# Patient Record
Sex: FEMALE | Race: WHITE | NOT HISPANIC OR LATINO | Employment: OTHER | URBAN - METROPOLITAN AREA
[De-identification: names, ages, dates, MRNs, and addresses within clinical notes are randomized per-mention and may not be internally consistent; named-entity substitution may affect disease eponyms.]

---

## 2019-07-12 ENCOUNTER — APPOINTMENT (OUTPATIENT)
Dept: RADIOLOGY | Facility: MEDICAL CENTER | Age: 74
End: 2019-07-12
Payer: MEDICARE

## 2019-07-12 ENCOUNTER — OFFICE VISIT (OUTPATIENT)
Dept: URGENT CARE | Facility: MEDICAL CENTER | Age: 74
End: 2019-07-12
Payer: MEDICARE

## 2019-07-12 VITALS
SYSTOLIC BLOOD PRESSURE: 154 MMHG | OXYGEN SATURATION: 96 % | DIASTOLIC BLOOD PRESSURE: 90 MMHG | TEMPERATURE: 98.4 F | RESPIRATION RATE: 18 BRPM | BODY MASS INDEX: 30.12 KG/M2 | WEIGHT: 170 LBS | HEART RATE: 97 BPM | HEIGHT: 63 IN
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
## 2019-07-12 DIAGNOSIS — S63.502A SPRAIN OF LEFT WRIST, INITIAL ENCOUNTER: Primary | ICD-10-CM

## 2019-07-12 PROCEDURE — 99204 OFFICE O/P NEW MOD 45 MIN: CPT | Performed by: FAMILY MEDICINE

## 2019-07-12 PROCEDURE — 73110 X-RAY EXAM OF WRIST: CPT

## 2019-07-12 PROCEDURE — G0463 HOSPITAL OUTPT CLINIC VISIT: HCPCS | Performed by: FAMILY MEDICINE

## 2019-07-12 RX ORDER — FLUTICASONE PROPIONATE AND SALMETEROL XINAFOATE 230; 21 UG/1; UG/1
2 AEROSOL, METERED RESPIRATORY (INHALATION) 2 TIMES DAILY
COMMUNITY
Start: 2019-07-02

## 2019-07-12 RX ORDER — PHENYTOIN SODIUM 100 MG/1
100 CAPSULE, EXTENDED RELEASE ORAL EVERY 12 HOURS SCHEDULED
COMMUNITY
Start: 2019-05-25 | End: 2020-01-04 | Stop reason: HOSPADM

## 2019-07-12 RX ORDER — ERGOCALCIFEROL 1.25 MG/1
CAPSULE ORAL
Refills: 0 | COMMUNITY
Start: 2019-06-18

## 2019-07-12 RX ORDER — CETIRIZINE HYDROCHLORIDE 10 MG/1
TABLET ORAL
COMMUNITY
Start: 2019-07-02

## 2019-07-12 RX ORDER — THEOPHYLLINE 400 MG/1
400 TABLET, EXTENDED RELEASE ORAL 2 TIMES DAILY
COMMUNITY
Start: 2019-07-08

## 2019-07-12 RX ORDER — ALLOPURINOL 100 MG/1
TABLET ORAL
COMMUNITY
Start: 2019-04-30

## 2019-07-12 RX ORDER — FLUTICASONE PROPIONATE 50 MCG
SPRAY, SUSPENSION (ML) NASAL
COMMUNITY
Start: 2019-07-02

## 2019-07-12 NOTE — PROGRESS NOTES
[facility]        NAME: Philippe Corcoran is a 74 y o  female  : 1945    MRN: [id number]  DATE: 2019  TIME: 7:35 PM    Assessment and Plan   Sprain of left wrist, initial encounter [S63 502A]  1  Sprain of left wrist, initial encounter     2  Fall, initial encounter  XR wrist 3+ vw left       X-ray shows diffuse osteoarthritis of the carpals and disturbed radius ulnar joint  Will place the patient in a brace  If pain continues follow-up with Orthopedics  Patient Instructions       Follow up with PCP in 3-5 days  Proceed to  ER if symptoms worsen  Chief Complaint     Chief Complaint   Patient presents with    Wrist Pain     Patient c/o L wrist pain after she fell today          History of Present Illness        40-year-old female presents with left wrist pain  She states that 2 days ago she tripped over her dog and fell on her hand  Ever since she has had left-sided wrist pain  Pain is worse with movement of the wrist and better with rest   She does have osteoarthritis of her hands bilaterally  She does have localized swelling        Review of Systems   Review of Systems  As above     Current Medications       Current Outpatient Medications:     ADVAIR -21 MCG/ACT inhaler, , Disp: , Rfl:     allopurinol (ZYLOPRIM) 100 mg tablet, , Disp: , Rfl:     cetirizine (ZyrTEC) 10 mg tablet, , Disp: , Rfl:     ergocalciferol (VITAMIN D2) 50,000 units, TK 1 C PO 1 TIME Q WK, Disp: , Rfl: 0    fluticasone (FLONASE) 50 mcg/act nasal spray, , Disp: , Rfl:     phenytoin (DILANTIN) 100 mg ER capsule, , Disp: , Rfl:     theophylline (UNIPHYL) 400 mg 24 hr tablet, , Disp: , Rfl:     VENTOLIN  (90 Base) MCG/ACT inhaler, , Disp: , Rfl:     Current Allergies     Allergies as of 2019    (No Known Allergies)            The following portions of the patient's history were reviewed and updated as appropriate: allergies, current medications, past family history, past medical history, past social history, past surgical history and problem list      No past medical history on file  No past surgical history on file  No family history on file  Medications have been verified  Objective   /90   Pulse 97   Temp 98 4 °F (36 9 °C)   Resp 18   Ht 5' 2 5" (1 588 m)   Wt 77 1 kg (170 lb)   SpO2 96%   BMI 30 60 kg/m²        Physical Exam     Physical Exam   Constitutional: She is oriented to person, place, and time  She appears well-developed and well-nourished  HENT:   Head: Normocephalic and atraumatic  Eyes: Conjunctivae are normal    Neck: Neck supple  Cardiovascular: Normal rate  Pulmonary/Chest: Effort normal  No respiratory distress  Abdominal: Soft  Musculoskeletal:     Robert's and Heberden's noted bilaterally   distal pulses 2+    minimal point tenderness over the distal wrist with localized swelling over the ulna  Range of motion restricted secondary to pain and osteoarthritis   strength 5/5 in all groups sensation intact  Compartments soft  Neurological: She is alert and oriented to person, place, and time  Skin: Skin is warm and dry  Psychiatric: She has a normal mood and affect  Her behavior is normal    Nursing note and vitals reviewed

## 2019-07-15 ENCOUNTER — TELEPHONE (OUTPATIENT)
Dept: OBGYN CLINIC | Facility: CLINIC | Age: 74
End: 2019-07-15

## 2019-07-15 DIAGNOSIS — S62.102A AVULSION FRACTURE OF LEFT WRIST: Primary | ICD-10-CM

## 2019-07-15 NOTE — TELEPHONE ENCOUNTER
I spoke to patient and she is braced  I advised that she should be fine to wait to see Dr Katelyn Pérez until 7/18

## 2019-07-15 NOTE — TELEPHONE ENCOUNTER
Dr Brianna Winter [phone] has several millimeter avulsion fragment is present adjacent to the medial aspect of the distal radial articular surface suspicious for acute fracture after a fall on 7/12/19  She is requesting [address] and is scheduled for 7/18/19  Patient is concerned that this is too long to wait and asked if I would check    Thank you

## 2019-07-16 ENCOUNTER — OFFICE VISIT (OUTPATIENT)
Dept: OBGYN CLINIC | Facility: CLINIC | Age: 74
End: 2019-07-16
Payer: MEDICARE

## 2019-07-16 VITALS
BODY MASS INDEX: 31.54 KG/M2 | WEIGHT: 178 LBS | HEART RATE: 84 BPM | HEIGHT: 63 IN | DIASTOLIC BLOOD PRESSURE: 90 MMHG | SYSTOLIC BLOOD PRESSURE: 152 MMHG

## 2019-07-16 DIAGNOSIS — S63.502A SPRAIN OF LEFT WRIST, INITIAL ENCOUNTER: Primary | ICD-10-CM

## 2019-07-16 DIAGNOSIS — M19.032 PRIMARY OSTEOARTHRITIS OF LEFT WRIST: ICD-10-CM

## 2019-07-16 PROCEDURE — 99203 OFFICE O/P NEW LOW 30 MIN: CPT | Performed by: ORTHOPAEDIC SURGERY

## 2019-07-16 NOTE — PROGRESS NOTES
CC:  Left wrist pain        Assessment:  Sprain left wrist    Plan :  I told the patient that I do not see any true acute wrist fracture, although any one of those arthritic bone spurs may also represent small chip fracture  In any case the treatment is the same  I made her a Tubigrip compression sock to wear underneath her cock-up wrist splint  I want the splint on full-time including sleeping for 1 month  She may take it off for bathing and then reapply the brace  I also encouraged her to move and use her fingers to prevent stiffness, as she already has significant arthritis in the joints of her fingers  She should put ice in a small trash bag or bag of frozen peas on that left wrist for 15 minutes 4 times a day as long as she has swelling  She can take Advil, Aleve, or Tylenol if needed for pain  I will see her back again in 1 month for clinical re-exam and beginning of a rehabilitation program depending on her symptoms at that visit    HPI:  Patient is a 78-year-old RHD female who presents today with chief complaint of left wrist pain secondary to injury sustained 7/10/2019  She reports that she was in her home when she tripped over her dog resulting in a [address] mechanism of injury of the left upper extremity  She waited 2 days before being seen at her local urgent care facility where x-rays were taken and read as suspicious for avulsion fracture  She was then referred to our orthopedic consultation  She reports having significant bruising and swelling in the posterior aspect of her left hand, and radial aspect of her wrist for the 1st few days, but today is the 1st time she notes that she has been able to see her own knuckles  She denies any associated numbness or tingling  She reports pain with fine motor manipulations such as holding a pen, or trying to fasten buttons  She has been taking Advil as needed for pain control, which she says provides mild relief    She has also been using a cock-up wrist splint for support  PMHx:         History reviewed  No pertinent past medical history  History reviewed  No pertinent surgical history  History reviewed  No pertinent family history      Social History     Socioeconomic History    Marital status: /Civil Union     Spouse name: Not on file    Number of children: Not on file    Years of education: Not on file    Highest education level: Not on file   Occupational History    Not on file   Social Needs    Financial resource strain: Not on file    Food insecurity:     Worry: Not on file     Inability: Not on file    Transportation needs:     Medical: Not on file     Non-medical: Not on file   Tobacco Use    Smoking status: Never Smoker    Smokeless tobacco: Never Used   Substance and Sexual Activity    Alcohol use: Never     Frequency: Never    Drug use: Never    Sexual activity: Not on file   Lifestyle    Physical activity:     Days per week: Not on file     Minutes per session: Not on file    Stress: Not on file   Relationships    Social connections:     Talks on phone: Not on file     Gets together: Not on file     Attends Mu-ism service: Not on file     Active member of club or organization: Not on file     Attends meetings of clubs or organizations: Not on file     Relationship status: Not on file    Intimate partner violence:     Fear of current or ex partner: Not on file     Emotionally abused: Not on file     Physically abused: Not on file     Forced sexual activity: Not on file   Other Topics Concern    Not on file   Social History Narrative    Not on file       Current Outpatient Medications   Medication Sig Dispense Refill    ADVAIR -21 MCG/ACT inhaler       allopurinol (ZYLOPRIM) 100 mg tablet       cetirizine (ZyrTEC) 10 mg tablet       ergocalciferol (VITAMIN D2) 50,000 units TK 1 C PO 1 TIME Q WK  0    fluticasone (FLONASE) 50 mcg/act nasal spray       phenytoin (DILANTIN) 100 mg ER capsule       theophylline (UNIPHYL) 400 mg 24 hr tablet       VENTOLIN  (90 Base) MCG/ACT inhaler        No current facility-administered medications for this visit  Allergies: Patient has no known allergies  ROS:  Positive for musculoskeletal complaints as noted above  The remaining 11/12 systems on the intake sheet that I reviewed were negative  PE:  /90   Pulse 84   Ht 5' 2 5" (1 588 m)   Wt 80 7 kg (178 lb)   BMI 32 04 kg/m²   Constitutional: The patient was  oriented to person, place, and time  Well-developed and well-nourished  In mild distress  HEENT: Vision intact  Hearing normal  Swallowing normal   Head: Normocephalic  Cardiovascular: Intact distal pulses  Pulse regular  Pulmonary/Chest: Effort normal  No respiratory distress  Neurological: Alert and oriented to person, place, and time  Skin: Skin is warm  Psychiatric: Normal mood and affect  Ortho Exam:    Left wrist - patient presents with diffuse osseous hypertrophy of multiple joints of the hands bilaterally, secondary to primary osteoarthritis  Skin is warm and dry to touch with no signs of erythema, ecchymosis, or infection  She has mild soft tissue swelling noted in the dorsal aspect of the left hand, and the radial aspect of the wrist   She has tenderness to palpation over radial styloid, ulnar styloid, and in the radiocarpal joint  She also has pain exacerbation with thumb opposition and flexion  Positive thumb CMC grind, positive thumb MCP grind  She demonstrates limited wrist flexion and extension; less than 20° of flexion, and less than 30° of extension  This is compared to 40° of flexion and 50° of extension on the contralateral upper extremity  She demonstrates full active and passive ROM of the left elbow and shoulder as compared to contralateral extremity, with no antecubital adenopathy noted on exam   2+ distal radial pulse with brisk capillary refill distally  Sensation to pinprick intact      Studies reviewed:  I personally reviewed left wrist x-rays, as well as the radiologist's report  She has severe degenerative changes in her wrist with bone spurs,, joint space narrowing, and 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

## 2019-07-16 NOTE — PATIENT INSTRUCTIONS
Plan :  I told the patient that I do not see any true acute wrist fracture, although any one of those arthritic bone spurs may also represent small chip fracture  In any case the treatment is the same  I made her a Tubigrip compression sock to wear underneath her cock-up wrist splint  I want the splint on full-time including sleeping for 1 month  She may take it off for bathing and then reapply the brace  I also encouraged her to move and use her fingers to prevent stiffness, as she already has significant arthritis in the joints of her fingers  She should put ice in a small trash bag or bag of frozen peas on that left wrist for 15 minutes 4 times a day as long as she has swelling  She can take Advil, Aleve, or Tylenol if needed for pain    I will see her back again in 1 month for clinical re-exam and beginning of a rehabilitation program depending on her symptoms at that visit

## 2019-07-16 NOTE — LETTER
July 16, 2019     Kaitlynn Brothers 53    Patient: Omar Peng   YOB: 1945   Date of Visit: 7/16/2019       Dear Ms Aggarwal: Thank you for referring Danielle Meza to me for evaluation  Below are my notes for this consultation  If you have questions, please do not hesitate to call me  I look forward to following your patient along with you  Sincerely,        Tan Cates MD        CC: No Recipients  Tan Cates MD  7/16/2019 11:06 AM  Sign at close encounter  CC:  Left wrist pain        Assessment:  Sprain left wrist    Plan :  I told the patient that I do not see any true acute wrist fracture, although any one of those arthritic bone spurs may also represent small chip fracture  In any case the treatment is the same  I made her a Tubigrip compression sock to wear underneath her cock-up wrist splint  I want the splint on full-time including sleeping for 1 month  She may take it off for bathing and then reapply the brace  I also encouraged her to move and use her fingers to prevent stiffness, as she already has significant arthritis in the joints of her fingers  She should put ice in a small trash bag or bag of frozen peas on that left wrist for 15 minutes 4 times a day as long as she has swelling  She can take Advil, Aleve, or Tylenol if needed for pain  I will see her back again in 1 month for clinical re-exam and beginning of a rehabilitation program depending on her symptoms at that visit    HPI:  Patient is a 20-year-old RHD female who presents today with chief complaint of left wrist pain secondary to injury sustained 7/10/2019  She reports that she was in her home when she tripped over her dog resulting in a [address] mechanism of injury of the left upper extremity  She waited 2 days before being seen at her local urgent care facility where x-rays were taken and read as suspicious for avulsion fracture    She was then referred to our orthopedic consultation  She reports having significant bruising and swelling in the posterior aspect of her left hand, and radial aspect of her wrist for the 1st few days, but today is the 1st time she notes that she has been able to see her own knuckles  She denies any associated numbness or tingling  She reports pain with fine motor manipulations such as holding a pen, or trying to fasten buttons  She has been taking Advil as needed for pain control, which she says provides mild relief  She has also been using a cock-up wrist splint for support  PMHx:         History reviewed  No pertinent past medical history  History reviewed  No pertinent surgical history  History reviewed  No pertinent family history      Social History     Socioeconomic History    Marital status: /Civil Union     Spouse name: Not on file    Number of children: Not on file    Years of education: Not on file    Highest education level: Not on file   Occupational History    Not on file   Social Needs    Financial resource strain: Not on file    Food insecurity:     Worry: Not on file     Inability: Not on file    Transportation needs:     Medical: Not on file     Non-medical: Not on file   Tobacco Use    Smoking status: Never Smoker    Smokeless tobacco: Never Used   Substance and Sexual Activity    Alcohol use: Never     Frequency: Never    Drug use: Never    Sexual activity: Not on file   Lifestyle    Physical activity:     Days per week: Not on file     Minutes per session: Not on file    Stress: Not on file   Relationships    Social connections:     Talks on phone: Not on file     Gets together: Not on file     Attends Synagogue service: Not on file     Active member of club or organization: Not on file     Attends meetings of clubs or organizations: Not on file     Relationship status: Not on file    Intimate partner violence:     Fear of current or ex partner: Not on file     Emotionally abused: Not on file     Physically abused: Not on file     Forced sexual activity: Not on file   Other Topics Concern    Not on file   Social History Narrative    Not on file       Current Outpatient Medications   Medication Sig Dispense Refill    ADVAIR -21 MCG/ACT inhaler       allopurinol (ZYLOPRIM) 100 mg tablet       cetirizine (ZyrTEC) 10 mg tablet       ergocalciferol (VITAMIN D2) 50,000 units TK 1 C PO 1 TIME Q WK  0    fluticasone (FLONASE) 50 mcg/act nasal spray       phenytoin (DILANTIN) 100 mg ER capsule       theophylline (UNIPHYL) 400 mg 24 hr tablet       VENTOLIN  (90 Base) MCG/ACT inhaler        No current facility-administered medications for this visit  Allergies: Patient has no known allergies  ROS:  Positive for musculoskeletal complaints as noted above  The remaining 11/12 systems on the intake sheet that I reviewed were negative  PE:  /90   Pulse 84   Ht 5' 2 5" (1 588 m)   Wt 80 7 kg (178 lb)   BMI 32 04 kg/m²    Constitutional: The patient was  oriented to person, place, and time  Well-developed and well-nourished  In mild distress  HEENT: Vision intact  Hearing normal  Swallowing normal   Head: Normocephalic  Cardiovascular: Intact distal pulses  Pulse regular  Pulmonary/Chest: Effort normal  No respiratory distress  Neurological: Alert and oriented to person, place, and time  Skin: Skin is warm  Psychiatric: Normal mood and affect  Ortho Exam:    Left wrist - patient presents with diffuse osseous hypertrophy of multiple joints of the hands bilaterally, secondary to primary osteoarthritis  Skin is warm and dry to touch with no signs of erythema, ecchymosis, or infection  She has mild soft tissue swelling noted in the dorsal aspect of the left hand, and the radial aspect of the wrist   She has tenderness to palpation over radial styloid, ulnar styloid, and in the radiocarpal joint    She also has pain exacerbation with thumb opposition and flexion  Positive thumb CMC grind, positive thumb MCP grind  She demonstrates limited wrist flexion and extension; less than 20° of flexion, and less than 30° of extension  This is compared to 40° of flexion and 50° of extension on the contralateral upper extremity  She demonstrates full active and passive ROM of the left elbow and shoulder as compared to contralateral extremity, with no antecubital adenopathy noted on exam   2+ distal radial pulse with brisk capillary refill distally  Sensation to pinprick intact  Studies reviewed:  I personally reviewed left wrist x-rays, as well as the radiologist's report  She has severe degenerative changes in her wrist with bone spurs,, joint space narrowing, and 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

## 2019-08-20 ENCOUNTER — HOSPITAL ENCOUNTER (OUTPATIENT)
Dept: NEUROLOGY | Facility: HOSPITAL | Age: 74
Discharge: HOME | End: 2019-08-20
Attending: ORTHOPAEDIC SURGERY
Payer: MEDICARE

## 2019-08-20 ENCOUNTER — OFFICE VISIT (OUTPATIENT)
Dept: OBGYN CLINIC | Facility: CLINIC | Age: 74
End: 2019-08-20
Payer: MEDICARE

## 2019-08-20 ENCOUNTER — APPOINTMENT (OUTPATIENT)
Dept: LAB | Facility: HOSPITAL | Age: 74
End: 2019-08-20
Attending: ORTHOPAEDIC SURGERY
Payer: MEDICARE

## 2019-08-20 VITALS — BODY MASS INDEX: 31.54 KG/M2 | HEIGHT: 63 IN | WEIGHT: 178 LBS

## 2019-08-20 DIAGNOSIS — M35.9 CONNECTIVE TISSUE DISORDER (HCC): ICD-10-CM

## 2019-08-20 DIAGNOSIS — M25.532 PAIN IN LEFT WRIST: Primary | ICD-10-CM

## 2019-08-20 DIAGNOSIS — G56.01 CARPAL TUNNEL SYNDROME ON RIGHT: ICD-10-CM

## 2019-08-20 LAB
CRP SERPL QL: 14.7 MG/L
ERYTHROCYTE [SEDIMENTATION RATE] IN BLOOD: 24 MM/HOUR (ref 1–20)
URATE SERPL-MCNC: 8.4 MG/DL (ref 2.7–7.5)

## 2019-08-20 PROCEDURE — 99213 OFFICE O/P EST LOW 20 MIN: CPT | Performed by: ORTHOPAEDIC SURGERY

## 2019-08-20 PROCEDURE — 95885 MUSC TST DONE W/NERV TST LIM: CPT | Performed by: PSYCHIATRY & NEUROLOGY

## 2019-08-20 PROCEDURE — 86618 LYME DISEASE ANTIBODY: CPT | Performed by: ORTHOPAEDIC SURGERY

## 2019-08-20 PROCEDURE — 86430 RHEUMATOID FACTOR TEST QUAL: CPT

## 2019-08-20 PROCEDURE — 86140 C-REACTIVE PROTEIN: CPT

## 2019-08-20 PROCEDURE — 95910 NRV CNDJ TEST 7-8 STUDIES: CPT | Performed by: PSYCHIATRY & NEUROLOGY

## 2019-08-20 PROCEDURE — 85652 RBC SED RATE AUTOMATED: CPT

## 2019-08-20 PROCEDURE — 36415 COLL VENOUS BLD VENIPUNCTURE: CPT | Performed by: ORTHOPAEDIC SURGERY

## 2019-08-20 PROCEDURE — 86038 ANTINUCLEAR ANTIBODIES: CPT

## 2019-08-20 PROCEDURE — 84550 ASSAY OF BLOOD/URIC ACID: CPT

## 2019-08-20 NOTE — PATIENT INSTRUCTIONS
Plan :  I am pleased with her clinical improvement and relief of pain from the left wrist   She can now discontinue her splint  I showed her a gentle home exercise program to do that I want her to do once a day every day to get the motion going  She can put ice on the area for 15 minutes 4 times a day and take Advil, Aleve, or Tylenol if needed for pain  She has clinical evidence of carpal tunnel syndrome of the opposite right wrist but I need to prove this conclusively before we do definitive surgical intervention  For that reason I sent her for EMGs and nerve conduction studies of this right arm with comparison to the left    I will see her a  week after the EMG is done to review the studies and recommend further treatment options

## 2019-08-20 NOTE — PROCEDURES
[facility]  Electromyography and Nerve Conduction Study      Patient Name:  Rodrigo Beckett  MRN: [id number]   :  1945 Date performed: 2019    Age: 66 y o  Consult request by: Hari Real MD      HISTORY:  Rodrigo Beckett is a 66 y o  female who complains of numbness and tingling in the 1st through 3rd fingers of the right hand  She is not sure when the symptoms began  The "flick sign" is positive  The paresthesias are present intermittently  They occur nocturnally  She denies numbness or tingling in the left upper extremity  She denies neck pain  She denies numbness or tingling in her feet or toes  She admits to weakness as manifested by difficulty picking things up with her right hand  She is referred to rule out carpal tunnel syndrome  No past medical history on file  Current Outpatient Medications:     ADVAIR -21 MCG/ACT inhaler, , Disp: , Rfl:     allopurinol (ZYLOPRIM) 100 mg tablet, , Disp: , Rfl:     cetirizine (ZyrTEC) 10 mg tablet, , Disp: , Rfl:     ergocalciferol (VITAMIN D2) 50,000 units, TK 1 C PO 1 TIME Q WK, Disp: , Rfl: 0    fluticasone (FLONASE) 50 mcg/act nasal spray, , Disp: , Rfl:     phenytoin (DILANTIN) 100 mg ER capsule, , Disp: , Rfl:     theophylline (UNIPHYL) 400 mg 24 hr tablet, , Disp: , Rfl:     VENTOLIN  (90 Base) MCG/ACT inhaler, , Disp: , Rfl:     PHYSICAL EXAMINATION:  In general, she was in no acute distress  Upper extremity power was grade 5 bilaterally except for thumb abduction, which was grade 4 bilaterally  In the right upper extremity, pinprick sensation was equavalent between the 2nd and 5th digits  Upper extremity reflexes were bilaterally symmetric and within normal limits  RESULTS:  EMG/NCS data and waveforms that were performed for this study have been scanned into Epic  Please refer to scanned document for waveforms  IMPRESSION:   This is an abnormal study    There is clinical and electrophysiologic evidence of a moderate to severe median neuropathy at the right wrist (e g  carpal tunnel syndrome), with electrophysiologic evidence 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

## 2019-08-20 NOTE — PROGRESS NOTES
CC:  Left wrist pain        Assessment:  Sprain left wrist    Plan :  I am pleased with her clinical improvement and relief of pain from the left wrist   She can now discontinue her splint  I showed her a gentle home exercise program to do that I want her to do once a day every day to get the motion going  She can put ice on the area for 15 minutes 4 times a day and take Advil, Aleve, or Tylenol if needed for pain  She has clinical evidence of carpal tunnel syndrome of the opposite right wrist but I need to prove this conclusively before we do definitive surgical intervention  For that reason I sent her for EMGs and nerve conduction studies of this right arm with comparison to the left  I will see her a  week after the EMG is done to review the studies and recommend further treatment options    HPI:  Patient is a 52-year-old RHD female who presents today with chief complaint of left wrist pain secondary to injury sustained 7/10/2019  She reports that she was in her home when she tripped over her dog resulting in a [address] mechanism of injury of the left upper extremity  She waited 2 days before being seen at her local urgent care facility where x-rays were taken and read as suspicious for avulsion fracture  She was then referred to our orthopedic consultation  She reports having significant bruising and swelling in the posterior aspect of her left hand, and radial aspect of her wrist for the 1st few days, but today is the 1st time she notes that she has been able to see her own knuckles  She denies any associated numbness or tingling  She reports pain with fine motor manipulations such as holding a pen, or trying to fasten buttons  She has been taking Advil as needed for pain control, which she says provides mild relief  She has also been using a cock-up wrist splint for support  She returns now approximately 1 month later on 08/20/2019    I treated a sprain of her left wrist conservatively with a splint and compression sock  The wrist is feeling much better with no further pain or swelling  She has persistent numbness and tingling in her thumb index and long fingers of the opposite right wrist without any injury  She denies diabetes or hypothyroidism  She does complain of night pain  She does have a history of gout    The remainder of this patient's past medical history, family history, social history, medicines, and allergies was reviewed in the chart  Please see HPI for pertinent review of systems  All other systems reviewed are negative  PE:  Ht 5' 2 5" (1 588 m)   Wt 80 7 kg (178 lb)   BMI 32 04 kg/m²   I removed the splint from her left wrist   There was no swelling, ecchymosis or deformity of the wrist   She has multiple deformities at the [facility] joint of her left thumb and multiple Heberden's and Robert's nodes in the IP joints of her fingers  There is a question of ulnar deviation noted  Wrist motion is surprisingly good and she has no tenderness over the radial or ulnar aspects of the wrist over the midline of the wrist   She has similar hand findings of the right wrist hand  She has a positive Tinel's and negative Phalen's test on the right  Sensation to pinprick was slightly decreased in the long finger of the right hand  Radial pulse was intact  She had good elbow flexion extension  Studies reviewed:  I personally reviewed left wrist x-rays, as well as the radiologist's report  She has severe degenerative changes in her wrist with bone spurs,, joint space narrowing, and 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

## 2019-08-21 DIAGNOSIS — M35.9 CONNECTIVE TISSUE DISORDER (HCC): Primary | ICD-10-CM

## 2019-08-21 LAB
RHEUMATOID FACT SER QL LA: NEGATIVE
RYE IGE QN: NEGATIVE

## 2019-08-22 LAB — B BURGDOR IGG+IGM SER-ACNC: <0.91 ISR (ref 0–0.9)

## 2019-08-27 ENCOUNTER — OFFICE VISIT (OUTPATIENT)
Dept: OBGYN CLINIC | Facility: CLINIC | Age: 74
End: 2019-08-27
Payer: MEDICARE

## 2019-08-27 VITALS — BODY MASS INDEX: 31.54 KG/M2 | HEIGHT: 63 IN | WEIGHT: 178 LBS

## 2019-08-27 DIAGNOSIS — G56.01 CARPAL TUNNEL SYNDROME ON RIGHT: Primary | ICD-10-CM

## 2019-08-27 DIAGNOSIS — M35.9 CONNECTIVE TISSUE DISORDER (HCC): ICD-10-CM

## 2019-08-27 PROCEDURE — 99213 OFFICE O/P EST LOW 20 MIN: CPT | Performed by: ORTHOPAEDIC SURGERY

## 2019-08-27 NOTE — PATIENT INSTRUCTIONS
Plan :  I explained the patient that her EMG was positive for pressure on the median nerve at the right wrist, classic carpal tunnel syndrome  This correlates with her symptoms also  I believe she would benefit greatly by carpal tunnel release done under local anesthesia with sedation and I explained this operation at length to the patient  I went over the potential risks of bleeding, infection, nerve or blood vessel injury, incomplete pain relief, or recurrence of symptoms, although these risks are quite low  I did tell her that I cannot promise that all of her symptoms will totally resolve because of the length of time that she has had symptoms  I will schedule the surgery to be done as an outpatient at [facility] on Thursday 09/05/2019  I would like her to get preop medical clearance by her family physician

## 2019-08-27 NOTE — PROGRESS NOTES
CC:  Left wrist pain        Assessment:  Sprain left wrist    Plan :  I explained the patient that her EMG was positive for pressure on the median nerve at the right wrist, classic carpal tunnel syndrome  This correlates with her symptoms also  I believe she would benefit greatly by carpal tunnel release done under local anesthesia with sedation and I explained this operation at length to the patient  I went over the potential risks of bleeding, infection, nerve or blood vessel injury, incomplete pain relief, or recurrence of symptoms, although these risks are quite low  I did tell her that I cannot promise that all of her symptoms will totally resolve because of the length of time that she has had symptoms  I will schedule the surgery to be done as an outpatient at [facility] on Thursday 09/05/2019  I would like her to get preop medical clearance by her family physician  HPI:  Patient is a 49-year-old RHD female who presents today with chief complaint of left wrist pain secondary to injury sustained 7/10/2019  She reports that she was in her home when she tripped over her dog resulting in a [address] mechanism of injury of the left upper extremity  She waited 2 days before being seen at her local urgent care facility where x-rays were taken and read as suspicious for avulsion fracture  She was then referred to our orthopedic consultation  She reports having significant bruising and swelling in the posterior aspect of her left hand, and radial aspect of her wrist for the 1st few days, but today is the 1st time she notes that she has been able to see her own knuckles  She denies any associated numbness or tingling  She reports pain with fine motor manipulations such as holding a pen, or trying to fasten buttons  She has been taking Advil as needed for pain control, which she says provides mild relief  She has also been using a cock-up wrist splint for support    She returns now approximately 1 month later on 08/20/2019  I treated a sprain of her left wrist conservatively with a splint and compression sock  The wrist is feeling much better with no further pain or swelling  She has persistent numbness and tingling in her thumb index and long fingers of the opposite right wrist without any injury  She denies diabetes or hypothyroidism  She does complain of night pain  She does have a history of gout  She returns now on 08/27/2019 she had the EMG is done  She still has night pain and numbness in her fingers which she relieves by shaking  This is interfering with her ability to do her normal ADLs    The remainder of this patient's past medical history, family history, social history, medicines, and allergies was reviewed in the chart  Please see HPI for pertinent review of systems  All other systems reviewed are negative  PE:  Ht 5' 2 5" (1 588 m)   Wt 80 7 kg (178 lb)   BMI 32 04 kg/m²   There was no swelling or redness about the right wrist although she has the finger deformities as noted previously on this side as well as the other side  She has a positive Tinel's and negative Phalen's test on the right  Sensation to pinprick was slightly decreased in the long finger of the right hand  Radial pulse was intact  She had good elbow flexion extension  She had no antecubital adenopathy  Studies reviewed:  I personally reviewed left wrist x-rays, as well as the radiologist's report  She has severe degenerative changes in her wrist with bone spurs,, joint space narrowing, and 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

## 2019-08-30 ENCOUNTER — APPOINTMENT (OUTPATIENT)
Dept: PREADMISSION TESTING | Facility: HOSPITAL | Age: 74
End: 2019-08-30
Payer: MEDICARE

## 2019-08-30 DIAGNOSIS — Z01.818 PREOP TESTING: Primary | ICD-10-CM

## 2019-08-30 LAB
ANION GAP SERPL CALCULATED.3IONS-SCNC: 11 MMOL/L (ref 5–14)
BASOPHILS # BLD AUTO: 0.1 THOUSANDS/ΜL (ref 0–0.1)
BASOPHILS NFR BLD AUTO: 1 % (ref 0–1)
BUN SERPL-MCNC: 15 MG/DL (ref 5–25)
CALCIUM SERPL-MCNC: 9.8 MG/DL (ref 8.4–10.2)
CHLORIDE SERPL-SCNC: 104 MMOL/L (ref 97–108)
CO2 SERPL-SCNC: 24 MMOL/L (ref 22–30)
CREAT SERPL-MCNC: 0.67 MG/DL (ref 0.6–1.2)
EOSINOPHIL # BLD AUTO: 0.8 THOUSAND/ΜL (ref 0–0.4)
EOSINOPHIL NFR BLD AUTO: 8 % (ref 0–6)
ERYTHROCYTE [DISTWIDTH] IN BLOOD BY AUTOMATED COUNT: 13.7 %
GFR SERPL CREATININE-BSD FRML MDRD: 86 ML/MIN/1.73SQ M
GLUCOSE P FAST SERPL-MCNC: 98 MG/DL (ref 70–99)
GLUCOSE SERPL-MCNC: 98 MG/DL (ref 70–99)
HCT VFR BLD AUTO: 39.4 % (ref 36–46)
HGB BLD-MCNC: 13.8 G/DL (ref 12–16)
LYMPHOCYTES # BLD AUTO: 2.2 THOUSANDS/ΜL (ref 0.5–4)
LYMPHOCYTES NFR BLD AUTO: 21 % (ref 25–45)
MCH RBC QN AUTO: 32.8 PG (ref 26–34)
MCHC RBC AUTO-ENTMCNC: 35 G/DL (ref 31–36)
MCV RBC AUTO: 94 FL (ref 80–100)
MONOCYTES # BLD AUTO: 0.6 THOUSAND/ΜL (ref 0.2–0.9)
MONOCYTES NFR BLD AUTO: 6 % (ref 1–10)
NEUTROPHILS # BLD AUTO: 6.7 THOUSANDS/ΜL (ref 1.8–7.8)
NEUTS SEG NFR BLD AUTO: 65 % (ref 45–65)
PLATELET # BLD AUTO: 324 THOUSANDS/UL (ref 150–450)
PMV BLD AUTO: 8.8 FL (ref 8.9–12.7)
POTASSIUM SERPL-SCNC: 4.3 MMOL/L (ref 3.6–5)
RBC # BLD AUTO: 4.2 MILLION/UL (ref 4–5.2)
SODIUM SERPL-SCNC: 139 MMOL/L (ref 137–147)
WBC # BLD AUTO: 10.3 THOUSAND/UL (ref 4.5–11)

## 2019-08-30 PROCEDURE — 80048 BASIC METABOLIC PNL TOTAL CA: CPT

## 2019-08-30 PROCEDURE — 85025 COMPLETE CBC W/AUTO DIFF WBC: CPT

## 2019-08-30 RX ORDER — COLCHICINE 0.6 MG/1
0.6 TABLET ORAL AS NEEDED
COMMUNITY

## 2019-09-04 ENCOUNTER — ANESTHESIA EVENT (OUTPATIENT)
Dept: PERIOP | Facility: HOSPITAL | Age: 74
End: 2019-09-04
Payer: MEDICARE

## 2019-09-05 ENCOUNTER — HOSPITAL ENCOUNTER (OUTPATIENT)
Facility: HOSPITAL | Age: 74
Setting detail: OUTPATIENT SURGERY
Discharge: HOME | End: 2019-09-05
Attending: ORTHOPAEDIC SURGERY | Admitting: ORTHOPAEDIC SURGERY
Payer: MEDICARE

## 2019-09-05 ENCOUNTER — ANESTHESIA (OUTPATIENT)
Dept: PERIOP | Facility: HOSPITAL | Age: 74
End: 2019-09-05
Payer: MEDICARE

## 2019-09-05 VITALS
RESPIRATION RATE: 16 BRPM | DIASTOLIC BLOOD PRESSURE: 64 MMHG | SYSTOLIC BLOOD PRESSURE: 138 MMHG | TEMPERATURE: 97.6 F | HEART RATE: 88 BPM | OXYGEN SATURATION: 98 %

## 2019-09-05 DIAGNOSIS — G56.01 CARPAL TUNNEL SYNDROME ON RIGHT: Primary | ICD-10-CM

## 2019-09-05 DIAGNOSIS — Z01.818 PREOP TESTING: ICD-10-CM

## 2019-09-05 PROCEDURE — 64721 CARPAL TUNNEL SURGERY: CPT | Performed by: PHYSICIAN ASSISTANT

## 2019-09-05 PROCEDURE — 64721 CARPAL TUNNEL SURGERY: CPT | Performed by: ORTHOPAEDIC SURGERY

## 2019-09-05 RX ORDER — TRAMADOL HYDROCHLORIDE 50 MG/1
50 TABLET ORAL EVERY 8 HOURS PRN
Qty: 12 TABLET | Refills: 0 | Status: CANCELLED | OUTPATIENT
Start: 2019-09-05

## 2019-09-05 RX ORDER — MIDAZOLAM HYDROCHLORIDE 1 MG/ML
INJECTION INTRAMUSCULAR; INTRAVENOUS AS NEEDED
Status: DISCONTINUED | OUTPATIENT
Start: 2019-09-05 | End: 2019-09-05 | Stop reason: SURG

## 2019-09-05 RX ORDER — ALBUTEROL SULFATE 2.5 MG/3ML
2.5 SOLUTION RESPIRATORY (INHALATION) ONCE
Status: DISCONTINUED | OUTPATIENT
Start: 2019-09-05 | End: 2019-09-05 | Stop reason: HOSPADM

## 2019-09-05 RX ORDER — TRAMADOL HYDROCHLORIDE 50 MG/1
50 TABLET ORAL EVERY 6 HOURS PRN
Status: DISCONTINUED | OUTPATIENT
Start: 2019-09-05 | End: 2019-09-05 | Stop reason: HOSPADM

## 2019-09-05 RX ORDER — SODIUM CHLORIDE 9 MG/ML
50 INJECTION, SOLUTION INTRAVENOUS CONTINUOUS
Status: DISCONTINUED | OUTPATIENT
Start: 2019-09-05 | End: 2019-09-05 | Stop reason: HOSPADM

## 2019-09-05 RX ORDER — PROPOFOL 10 MG/ML
INJECTION, EMULSION INTRAVENOUS CONTINUOUS PRN
Status: DISCONTINUED | OUTPATIENT
Start: 2019-09-05 | End: 2019-09-05 | Stop reason: SURG

## 2019-09-05 RX ORDER — MAGNESIUM HYDROXIDE 1200 MG/15ML
LIQUID ORAL AS NEEDED
Status: DISCONTINUED | OUTPATIENT
Start: 2019-09-05 | End: 2019-09-05 | Stop reason: HOSPADM

## 2019-09-05 RX ORDER — FENTANYL CITRATE 50 UG/ML
INJECTION, SOLUTION INTRAMUSCULAR; INTRAVENOUS AS NEEDED
Status: DISCONTINUED | OUTPATIENT
Start: 2019-09-05 | End: 2019-09-05 | Stop reason: SURG

## 2019-09-05 RX ORDER — SODIUM CHLORIDE, SODIUM LACTATE, POTASSIUM CHLORIDE, CALCIUM CHLORIDE 600; 310; 30; 20 MG/100ML; MG/100ML; MG/100ML; MG/100ML
50 INJECTION, SOLUTION INTRAVENOUS CONTINUOUS
Status: DISCONTINUED | OUTPATIENT
Start: 2019-09-05 | End: 2019-09-05 | Stop reason: HOSPADM

## 2019-09-05 RX ORDER — SODIUM CHLORIDE, SODIUM LACTATE, POTASSIUM CHLORIDE, CALCIUM CHLORIDE 600; 310; 30; 20 MG/100ML; MG/100ML; MG/100ML; MG/100ML
INJECTION, SOLUTION INTRAVENOUS CONTINUOUS PRN
Status: DISCONTINUED | OUTPATIENT
Start: 2019-09-05 | End: 2019-09-05 | Stop reason: SURG

## 2019-09-05 RX ORDER — ROPIVACAINE HYDROCHLORIDE 2 MG/ML
INJECTION, SOLUTION EPIDURAL; INFILTRATION; PERINEURAL
Status: COMPLETED | OUTPATIENT
Start: 2019-09-05 | End: 2019-09-05

## 2019-09-05 RX ADMIN — FENTANYL CITRATE 25 MCG: 50 INJECTION INTRAMUSCULAR; INTRAVENOUS at 07:42

## 2019-09-05 RX ADMIN — MIDAZOLAM HYDROCHLORIDE 1 MG: 1 INJECTION, SOLUTION INTRAMUSCULAR; INTRAVENOUS at 07:29

## 2019-09-05 RX ADMIN — SODIUM CHLORIDE, SODIUM LACTATE, POTASSIUM CHLORIDE, AND CALCIUM CHLORIDE: .6; .31; .03; .02 INJECTION, SOLUTION INTRAVENOUS at 07:22

## 2019-09-05 RX ADMIN — FENTANYL CITRATE 50 MCG: 50 INJECTION INTRAMUSCULAR; INTRAVENOUS at 07:30

## 2019-09-05 RX ADMIN — PROPOFOL 140 MCG/KG/MIN: 10 INJECTION, EMULSION INTRAVENOUS at 07:34

## 2019-09-05 RX ADMIN — MIDAZOLAM HYDROCHLORIDE 1 MG: 1 INJECTION, SOLUTION INTRAMUSCULAR; INTRAVENOUS at 07:33

## 2019-09-05 RX ADMIN — FENTANYL CITRATE 25 MCG: 50 INJECTION INTRAMUSCULAR; INTRAVENOUS at 07:46

## 2019-09-05 NOTE — ANESTHESIA PREPROCEDURE EVALUATION
Review of Systems/Medical History      No history of anesthetic complications     Cardiovascular  Exercise tolerance (METS): >4,     Pulmonary  Negative pulmonary ROS Not a smoker , Asthma , well controlled/ stable , No shortness of breath,        GI/Hepatic  Negative GI/hepatic ROS          Negative  ROS        Endo/Other  Negative endo/other ROS      GYN  Negative gynecology ROS          Hematology  Negative hematology ROS      Musculoskeletal  Negative musculoskeletal ROS   Arthritis     Neurology  Negative neurology ROS Seizures well controlled,     Psychology   Negative psychology ROS              Physical Exam    Airway    Mallampati score: II  TM Distance: >3 FB  Neck ROM: full     Dental       Cardiovascular  Cardiovascular exam normal    Pulmonary  Pulmonary exam normal     Other Findings        Anesthesia Plan  ASA Score- 2     Anesthesia Type- IV sedation with anesthesia with ASA Monitors  Additional Monitors:   Airway Plan:         Plan Factors-Patient not instructed to abstain from smoking on day of procedure  Patient did not smoke on day of surgery  Induction- intravenous  Postoperative Plan- Plan for postoperative opioid use  Informed Consent- Anesthetic plan and risks discussed with patient and spouse              No results found for: HGBA1C    Lab Results   Component Value Date    K 4 3 08/30/2019     08/30/2019    CO2 24 08/30/2019    BUN 15 08/30/2019    CREATININE 0 67 08/30/2019    GLUF 98 08/30/2019    CALCIUM 9 8 08/30/2019    EGFR 86 08/30/2019       Lab Results   Component Value Date    WBC 10 30 08/30/2019    HGB 13 8 08/30/2019    HCT 39 4 08/30/2019    MCV 94 08/30/2019     08/30/2019

## 2019-09-05 NOTE — ANESTHESIA POSTPROCEDURE EVALUATION
Post-Op Assessment Note    CV Status:  Stable  Pain Score: 0    Pain management: adequate     Mental Status:  Alert and awake   Hydration Status:  Euvolemic   PONV Controlled:  Controlled   Airway Patency:  Patent   Post Op Vitals Reviewed: Yes      Staff: Anesthesiologist           BP   90/60   Temp      Pulse  84   Resp   15   SpO2   95% 4L NC

## 2019-09-05 NOTE — INTERVAL H&P NOTE
H&P reviewed  After examining the patient I find no changes in the patients condition since the H&P had been written      Vitals:    09/05/19 0547   BP: 141/68   Pulse: 100   Resp: 14   Temp: 97 9 °F (36 6 °C)   SpO2: 95%

## 2019-09-05 NOTE — OP NOTE
OPERATIVE REPORT  PATIENT NAME: Alison Fox    :  1945  MRN: [id number]  Pt Location:  OR ROOM 12    SURGERY DATE: 2019    Surgeon(s) and Role:     * Rupa Thorne MD - Primary     * Luca Ta PA-C - Assisting    Preop Diagnosis:  Carpal tunnel syndrome on right [G56 01]    Post-Op Diagnosis Codes:     * Carpal tunnel syndrome on right [G56 01]    Procedure(s) (LRB):  RELEASE CARPAL TUNNEL WRIST (Right)    Specimen(s):  * No specimens in log *    Estimated Blood Loss:   Minimal    Drains:  * No LDAs found *    Anesthesia Type:   IV Sedation with Anesthesia    Operative Indications:  Carpal tunnel syndrome on right [G56 01]  Numbness tingling right hand with failure to respond to conservative treatment    Operative Findings: Thickened transverse carpal ligament right wrist    Complications:   None    Procedure and Technique:  After satisfactory IV sedation was induced, the right fingers, hand, and arm, and  were scrubbed with 3% PCMX, painted with ChloraPrep, and draped in a sterile manner  The proposed incision was marked  at the intersection of Sethi's cardinal line and a line drawn from the ulnar aspect of the flexed ring finger  This was measured on the skin for approximately 3 cm  The area was infiltrated with 0 2% Naropin as well as the skin on the ulnar aspect of the distal forearm  The limb was then exsanguinated and the pneumatic tourniquet was inflated to 250 mm of mercury  An incision was made over the marked area of the skin down through skin and subcutaneous tissue  Retraction with Hulon Redder was done to retract the subcutaneous tissue out of the way and the deep palmar fascia was visualized  This was incised with a 59 [place] blade  The deepest portion of the transverse carpal ligament was incised carefully as the median nerve was directly underneath  It should be noted that the ligament was thickened and  firm on cutting    The nerve was then visualized and using a wetted [place] elevator any scarring was freed from underneath the ligament distally and proximally to break up any adhesions between the nerve and the tissue  Then a carpal tunnel knife was used and directed slightly ulnarly to free the remainder of the proximal transverse carpal ligament and the deep palmar fascia of the forearm  Distally, small dissecting scissors were used to free the remainder of the ligament  The nerve was then totally freed  The tourniquet was deflated  Bleeding vessels were coagulated with bipolar electrocautery and the wound was thoroughly irrigated with saline  The skin only was closed with mattress sutures of 4- 0 Prolene  Adaptic gauze, 4 x 4s,  fluffs and a compression wrap were applied and the procedure was terminated  The patient tolerated the  procedure well and was transferred to the recovery room in satisfactory, stable condition     A physician assistant was required during the procedure for retraction tissue handling,dissection and suturing    Patient Disposition:  PACU     SIGNATURE: Keira Wise MD  DATE: September 5, 2019  TIME: 8:04 AM

## 2019-09-05 NOTE — DISCHARGE INSTR - AVS FIRST PAGE
Dr Pk Huffman   for Patients   following   Carpal Tunnel Release      Apply ice to the wrist using a large trash bag or large bag of frozen peas for 15 minutes, 4 times a day, for 2-3 days may help with pain or swelling  Remove the postoperative dressing on the fifth day after your surgery  Place band-aids over the incision and wrap gently with the ace bandage provided  Sponge baths only for the first seven days and shower carefully thereafter  Pat the wounds dry  DO NOT bathe, soak the wounds or swim until seen in the office  Trash bags leak- do not use them over your wounds  Elevate the arm for the next 4-5 days- rest your elbow on bed or the arm of a chair and put hand above heart; do not let your arm hang down  Wiggle, wiggle, wiggle your fingers, even if slightly painful- do not let your fingers get stiff  No lifting or driving until seen in the office  Tramadol 50 mg-1 tablet- or two Tylenol [or two Advil or one Aleve] every 4-6 hours, if needed, for pain      [facility] the [facility] choice [numbers above] to make a post-operative appointment, unless one has been made for you already

## 2019-09-06 ENCOUNTER — TELEPHONE (OUTPATIENT)
Dept: OBGYN CLINIC | Facility: CLINIC | Age: 74
End: 2019-09-06

## 2019-09-06 DIAGNOSIS — G56.01 CARPAL TUNNEL SYNDROME ON RIGHT: Primary | ICD-10-CM

## 2019-09-06 RX ORDER — TRAMADOL HYDROCHLORIDE 50 MG/1
50 TABLET ORAL EVERY 6 HOURS PRN
Qty: 12 TABLET | Refills: 0 | Status: SHIPPED | OUTPATIENT
Start: 2019-09-06

## 2019-09-19 ENCOUNTER — OFFICE VISIT (OUTPATIENT)
Dept: OBGYN CLINIC | Facility: CLINIC | Age: 74
End: 2019-09-19

## 2019-09-19 VITALS — WEIGHT: 178 LBS | HEIGHT: 63 IN | BODY MASS INDEX: 31.54 KG/M2

## 2019-09-19 DIAGNOSIS — G56.01 CARPAL TUNNEL SYNDROME ON RIGHT: Primary | ICD-10-CM

## 2019-09-19 PROCEDURE — 99024 POSTOP FOLLOW-UP VISIT: CPT | Performed by: ORTHOPAEDIC SURGERY

## 2019-09-19 PROCEDURE — 1124F ACP DISCUSS-NO DSCNMKR DOCD: CPT | Performed by: ORTHOPAEDIC SURGERY

## 2019-09-19 NOTE — PATIENT INSTRUCTIONS
I am happy with her early results from the carpal tunnel release  I now want her  to wash her hand in warm soapy water for 5 minutes 4 times a day for the next 2 weeks  She should move her  fingers in the water to encourage motion  I then want her to rinse the soap off thoroughly, dry the skin, and then rub cocoa butter or  vitamin E cream firmly into the scar for one minute 4 times a day for the next month to soften the scar     She can use the hand for normal activities, but I want her  to hold off on heavy lifting or strenuous activities yet   I will see her back again in one month for routine follow-up

## 2019-09-19 NOTE — PROGRESS NOTES
CC:  Left wrist pain        Assessment:  Sprain left wrist    Plan:  I am happy with her early results from the carpal tunnel release  I now want her  to wash her hand in warm soapy water for 5 minutes 4 times a day for the next 2 weeks  She should move her  fingers in the water to encourage motion  I then want her to rinse the soap off thoroughly, dry the skin, and then rub cocoa butter or  vitamin E cream firmly into the scar for one minute 4 times a day for the next month to soften the scar     She can use the hand for normal activities, but I want her  to hold off on heavy lifting or strenuous activities yet  I will see her back again in one month for routine follow-up        HPI:  Patient is a 75-year-old RHD female who presents today with chief complaint of left wrist pain secondary to injury sustained 7/10/2019  She reports that she was in her home when she tripped over her dog resulting in a [address] mechanism of injury of the left upper extremity  She waited 2 days before being seen at her local urgent care facility where x-rays were taken and read as suspicious for avulsion fracture  She was then referred to our orthopedic consultation  She reports having significant bruising and swelling in the posterior aspect of her left hand, and radial aspect of her wrist for the 1st few days, but today is the 1st time she notes that she has been able to see her own knuckles  She denies any associated numbness or tingling  She reports pain with fine motor manipulations such as holding a pen, or trying to fasten buttons  She has been taking Advil as needed for pain control, which she says provides mild relief  She has also been using a cock-up wrist splint for support  She returns now approximately 1 month later on 08/20/2019  I treated a sprain of her left wrist conservatively with a splint and compression sock  The wrist is feeling much better with no further pain or swelling    She has persistent numbness and tingling in her thumb index and long fingers of the opposite right wrist without any injury  She denies diabetes or hypothyroidism  She does complain of night pain  She does have a history of gout  She returns now on 08/27/2019 she had the EMG is done  She still has night pain and numbness in her fingers which she relieves by shaking  This is interfering with her ability to do her normal ADLs  Because of persistent symptoms, especially night pain, she was taken to the operating room at [facility] [place] on 9/5//19 were performed uneventful right carpal tunnel release under local anesthesia  She returns now on 09/19/2019 for suture removal   Her night pain and numbness or just about totally gone  She has minimal residual numbness in the tips of her index and long fingers only    The remainder of this patient's past medical history, family history, social history, medicines, and allergies was reviewed in the chart  Please see HPI for pertinent review of systems  All other systems reviewed are negative  PE:  Ht 5' 2 5" (1 588 m)   Wt 80 7 kg (178 lb)   BMI 32 04 kg/m²   I removed the sutures from her right palm  She had the pre-existing finger deformities with Robert's and Heberden's nodes and limited motion  Sensation to light touch was intact in her fingers and thumb motion including flexion of the IP joint was normal   She had a good radial pulse  There was no cellulitis or discharge from the wound  Studies reviewed:  I personally reviewed left wrist x-rays, as well as the radiologist's report  She has severe degenerative changes in her wrist with bone spurs,, joint space narrowing, and 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

## 2019-10-24 ENCOUNTER — OFFICE VISIT (OUTPATIENT)
Dept: OBGYN CLINIC | Facility: CLINIC | Age: 74
End: 2019-10-24

## 2019-10-24 VITALS — WEIGHT: 178 LBS | HEIGHT: 63 IN | BODY MASS INDEX: 31.54 KG/M2

## 2019-10-24 DIAGNOSIS — G56.01 CARPAL TUNNEL SYNDROME ON RIGHT: Primary | ICD-10-CM

## 2019-10-24 PROCEDURE — 99024 POSTOP FOLLOW-UP VISIT: CPT | Performed by: ORTHOPAEDIC SURGERY

## 2019-10-24 NOTE — PROGRESS NOTES
CC:  Left wrist pain        Assessment:  Sprain left wrist    Plan:  She has obtained excellent clinical relief from the right  carpal tunnel release  I encouraged her to do all activities as tolerated letting pain be the warning guide to her activity level  She has done very nicely with scar desensitization and this can be stopped  She can always use symptomatic treatment of ice and Advil if needed but I do not think that will be necessary  I sent her back to her family physician for routine care and would be happy to see her back again if she has any further symptoms on this side or on the opposite left wrist     HPI:  Patient is a 70-year-old RHD female who presents today with chief complaint of left wrist pain secondary to injury sustained 7/10/2019  She reports that she was in her home when she tripped over her dog resulting in a [address] mechanism of injury of the left upper extremity  She waited 2 days before being seen at her local urgent care facility where x-rays were taken and read as suspicious for avulsion fracture  She was then referred to our orthopedic consultation  She reports having significant bruising and swelling in the posterior aspect of her left hand, and radial aspect of her wrist for the 1st few days, but today is the 1st time she notes that she has been able to see her own knuckles  She denies any associated numbness or tingling  She reports pain with fine motor manipulations such as holding a pen, or trying to fasten buttons  She has been taking Advil as needed for pain control, which she says provides mild relief  She has also been using a cock-up wrist splint for support  She returns now approximately 1 month later on 08/20/2019  I treated a sprain of her left wrist conservatively with a splint and compression sock  The wrist is feeling much better with no further pain or swelling    She has persistent numbness and tingling in her thumb index and long fingers of the opposite right wrist without any injury  She denies diabetes or hypothyroidism  She does complain of night pain  She does have a history of gout  She returns now on 08/27/2019 she had the EMG is done  She still has night pain and numbness in her fingers which she relieves by shaking  This is interfering with her ability to do her normal ADLs  Because of persistent symptoms, especially night pain, she was taken to the operating room at [facility] on 9/5//19 were performed uneventful right carpal tunnel release under local anesthesia  She returns now on 09/19/2019 for suture removal   Her night pain and numbness or just about totally gone  She has minimal residual numbness in the tips of her index and long fingers only  Her next visit is on 10/24/2019  She is 7 weeks after right carpal tunnel release  Her pain is gone and her night pain is gone  She has minimal residual numbness in the tip now just of the index finger  She is using her hand for normal activities daily living    The remainder of this patient's past medical history, family history, social history, medicines, and allergies was reviewed in the chart  Please see HPI for pertinent review of systems  All other systems reviewed are negative  She has a history of asthma, severe arthritis, gout, vitamin-D deficiency, seizures and seasonal allergies    PE:  Ht 5' 2 5" (1 588 m)   Wt 80 7 kg (178 lb)   BMI 32 04 kg/m²   Her right wrist incision was  clean and healed and was now soft  She was nontender over the area  She has regained good wrist motion equal to the other side  She has severe Heberden's and Robert's nodes in her IP joints of her hand which limited full flexion  Radial pulse was normal   Sensation to light touch was normal in all 5 fingers  She had good elbow flexion extension  Studies reviewed:  I personally reviewed left wrist x-rays, as well as the radiologist's report   She has severe degenerative changes in her wrist with bone spurs,, joint space narrowing, and 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

## 2019-10-24 NOTE — PATIENT INSTRUCTIONS
Plan:  She has obtained excellent clinical relief from the right  carpal tunnel release  I encouraged her to do all activities as tolerated letting pain be the warning guide to her activity level  She has done very nicely with scar desensitization and this can be stopped  She can always use symptomatic treatment of ice and Advil if needed but I do not think that will be necessary    I sent her back to her family physician for routine care and would be happy to see her back again if she has any further symptoms on this side or on the opposite left wrist

## 2020-01-02 ENCOUNTER — APPOINTMENT (EMERGENCY)
Dept: RADIOLOGY | Facility: HOSPITAL | Age: 75
DRG: 202 | End: 2020-01-02
Payer: MEDICARE

## 2020-01-02 ENCOUNTER — APPOINTMENT (EMERGENCY)
Dept: CT IMAGING | Facility: HOSPITAL | Age: 75
DRG: 202 | End: 2020-01-02
Payer: MEDICARE

## 2020-01-02 ENCOUNTER — HOSPITAL ENCOUNTER (INPATIENT)
Facility: HOSPITAL | Age: 75
LOS: 2 days | Discharge: HOME | DRG: 202 | End: 2020-01-04
Attending: EMERGENCY MEDICINE | Admitting: HOSPITALIST
Payer: MEDICARE

## 2020-01-02 DIAGNOSIS — J98.11 ATELECTASIS OF RIGHT LUNG: Primary | ICD-10-CM

## 2020-01-02 DIAGNOSIS — J45.901 ASTHMA EXACERBATION: ICD-10-CM

## 2020-01-02 DIAGNOSIS — G40.901 NONINTRACTABLE EPILEPSY WITH STATUS EPILEPTICUS, UNSPECIFIED EPILEPSY TYPE (HCC): ICD-10-CM

## 2020-01-02 DIAGNOSIS — B33.8 RSV INFECTION: ICD-10-CM

## 2020-01-02 PROBLEM — J98.19 RIGHT MIDDLE LOBE SYNDROME: Status: ACTIVE | Noted: 2020-01-02

## 2020-01-02 PROBLEM — G40.909 EPILEPSY (HCC): Status: ACTIVE | Noted: 2020-01-02

## 2020-01-02 LAB
ALBUMIN SERPL BCP-MCNC: 4.2 G/DL (ref 3.5–5)
ALP SERPL-CCNC: 170 U/L (ref 46–116)
ALT SERPL W P-5'-P-CCNC: 23 U/L (ref 12–78)
ANION GAP SERPL CALCULATED.3IONS-SCNC: 14 MMOL/L (ref 4–13)
APTT PPP: 28 SECONDS (ref 23–37)
AST SERPL W P-5'-P-CCNC: 20 U/L (ref 5–45)
BACTERIA UR QL AUTO: ABNORMAL /HPF
BASOPHILS # BLD AUTO: 0.08 THOUSANDS/ΜL (ref 0–0.1)
BASOPHILS NFR BLD AUTO: 1 % (ref 0–1)
BILIRUB SERPL-MCNC: 0.35 MG/DL (ref 0.2–1)
BILIRUB UR QL STRIP: NEGATIVE
BILIRUB UR QL STRIP: NEGATIVE
BUN SERPL-MCNC: 13 MG/DL (ref 5–25)
CALCIUM SERPL-MCNC: 9.6 MG/DL (ref 8.3–10.1)
CHLORIDE SERPL-SCNC: 103 MMOL/L (ref 100–108)
CLARITY UR: CLEAR
CLARITY UR: CLEAR
CO2 SERPL-SCNC: 25 MMOL/L (ref 21–32)
COLOR UR: YELLOW
COLOR UR: YELLOW
CREAT SERPL-MCNC: 0.95 MG/DL (ref 0.6–1.3)
EOSINOPHIL # BLD AUTO: 0.49 THOUSAND/ΜL (ref 0–0.61)
EOSINOPHIL NFR BLD AUTO: 5 % (ref 0–6)
ERYTHROCYTE [DISTWIDTH] IN BLOOD BY AUTOMATED COUNT: 13.2 % (ref 11.6–15.1)
FLUAV RNA NPH QL NAA+PROBE: ABNORMAL
FLUBV RNA NPH QL NAA+PROBE: ABNORMAL
GFR SERPL CREATININE-BSD FRML MDRD: 58 ML/MIN/1.73SQ M
GLUCOSE SERPL-MCNC: 84 MG/DL (ref 65–140)
GLUCOSE UR STRIP-MCNC: NEGATIVE MG/DL
GLUCOSE UR STRIP-MCNC: NEGATIVE MG/DL
HCT VFR BLD AUTO: 42 % (ref 34.8–46.1)
HGB BLD-MCNC: 14.1 G/DL (ref 11.5–15.4)
HGB UR QL STRIP.AUTO: ABNORMAL
HGB UR QL STRIP.AUTO: ABNORMAL
IMM GRANULOCYTES # BLD AUTO: 0.05 THOUSAND/UL (ref 0–0.2)
IMM GRANULOCYTES NFR BLD AUTO: 1 % (ref 0–2)
INR PPP: 1.06 (ref 0.84–1.19)
KETONES UR STRIP-MCNC: NEGATIVE MG/DL
KETONES UR STRIP-MCNC: NEGATIVE MG/DL
LACTATE SERPL-SCNC: 2.2 MMOL/L (ref 0.5–2)
LACTATE SERPL-SCNC: 3.1 MMOL/L (ref 0.5–2)
LEUKOCYTE ESTERASE UR QL STRIP: ABNORMAL
LEUKOCYTE ESTERASE UR QL STRIP: ABNORMAL
LYMPHOCYTES # BLD AUTO: 1.64 THOUSANDS/ΜL (ref 0.6–4.47)
LYMPHOCYTES NFR BLD AUTO: 15 % (ref 14–44)
MCH RBC QN AUTO: 32.4 PG (ref 26.8–34.3)
MCHC RBC AUTO-ENTMCNC: 33.6 G/DL (ref 31.4–37.4)
MCV RBC AUTO: 97 FL (ref 82–98)
MONOCYTES # BLD AUTO: 0.92 THOUSAND/ΜL (ref 0.17–1.22)
MONOCYTES NFR BLD AUTO: 8 % (ref 4–12)
NEUTROPHILS # BLD AUTO: 7.82 THOUSANDS/ΜL (ref 1.85–7.62)
NEUTS SEG NFR BLD AUTO: 70 % (ref 43–75)
NITRITE UR QL STRIP: NEGATIVE
NITRITE UR QL STRIP: NEGATIVE
NON-SQ EPI CELLS URNS QL MICRO: ABNORMAL /HPF
NRBC BLD AUTO-RTO: 0 /100 WBCS
PH UR STRIP.AUTO: 6 [PH]
PH UR STRIP.AUTO: 6 [PH] (ref 4.5–8)
PLATELET # BLD AUTO: 353 THOUSANDS/UL (ref 149–390)
PMV BLD AUTO: 9.3 FL (ref 8.9–12.7)
POTASSIUM SERPL-SCNC: 4.1 MMOL/L (ref 3.5–5.3)
PROCALCITONIN SERPL-MCNC: 0.2 NG/ML
PROT SERPL-MCNC: 7.7 G/DL (ref 6.4–8.2)
PROT UR STRIP-MCNC: NEGATIVE MG/DL
PROT UR STRIP-MCNC: NEGATIVE MG/DL
PROTHROMBIN TIME: 13.9 SECONDS (ref 11.6–14.5)
RBC # BLD AUTO: 4.35 MILLION/UL (ref 3.81–5.12)
RBC #/AREA URNS AUTO: ABNORMAL /HPF
RSV RNA NPH QL NAA+PROBE: DETECTED
SODIUM SERPL-SCNC: 142 MMOL/L (ref 136–145)
SP GR UR STRIP.AUTO: 1.01 (ref 1–1.03)
SP GR UR STRIP.AUTO: 1.01 (ref 1–1.03)
TROPONIN I SERPL-MCNC: <0.02 NG/ML
UROBILINOGEN UR QL STRIP.AUTO: 0.2 E.U./DL
UROBILINOGEN UR QL STRIP.AUTO: 0.2 E.U./DL
WBC # BLD AUTO: 11 THOUSAND/UL (ref 4.31–10.16)
WBC #/AREA URNS AUTO: ABNORMAL /HPF

## 2020-01-02 PROCEDURE — 87631 RESP VIRUS 3-5 TARGETS: CPT | Performed by: EMERGENCY MEDICINE

## 2020-01-02 PROCEDURE — 84145 PROCALCITONIN (PCT): CPT | Performed by: EMERGENCY MEDICINE

## 2020-01-02 PROCEDURE — 71046 X-RAY EXAM CHEST 2 VIEWS: CPT

## 2020-01-02 PROCEDURE — 80053 COMPREHEN METABOLIC PANEL: CPT | Performed by: EMERGENCY MEDICINE

## 2020-01-02 PROCEDURE — 96375 TX/PRO/DX INJ NEW DRUG ADDON: CPT

## 2020-01-02 PROCEDURE — 81001 URINALYSIS AUTO W/SCOPE: CPT | Performed by: EMERGENCY MEDICINE

## 2020-01-02 PROCEDURE — 99285 EMERGENCY DEPT VISIT HI MDM: CPT

## 2020-01-02 PROCEDURE — 99223 1ST HOSP IP/OBS HIGH 75: CPT | Performed by: INTERNAL MEDICINE

## 2020-01-02 PROCEDURE — 84484 ASSAY OF TROPONIN QUANT: CPT | Performed by: EMERGENCY MEDICINE

## 2020-01-02 PROCEDURE — 83605 ASSAY OF LACTIC ACID: CPT | Performed by: EMERGENCY MEDICINE

## 2020-01-02 PROCEDURE — 96365 THER/PROPH/DIAG IV INF INIT: CPT

## 2020-01-02 PROCEDURE — 71250 CT THORAX DX C-: CPT

## 2020-01-02 PROCEDURE — 36415 COLL VENOUS BLD VENIPUNCTURE: CPT | Performed by: EMERGENCY MEDICINE

## 2020-01-02 PROCEDURE — 94640 AIRWAY INHALATION TREATMENT: CPT

## 2020-01-02 PROCEDURE — 87040 BLOOD CULTURE FOR BACTERIA: CPT | Performed by: EMERGENCY MEDICINE

## 2020-01-02 PROCEDURE — 99285 EMERGENCY DEPT VISIT HI MDM: CPT | Performed by: EMERGENCY MEDICINE

## 2020-01-02 PROCEDURE — 96361 HYDRATE IV INFUSION ADD-ON: CPT

## 2020-01-02 PROCEDURE — 85025 COMPLETE CBC W/AUTO DIFF WBC: CPT | Performed by: EMERGENCY MEDICINE

## 2020-01-02 PROCEDURE — 1124F ACP DISCUSS-NO DSCNMKR DOCD: CPT | Performed by: EMERGENCY MEDICINE

## 2020-01-02 PROCEDURE — 85730 THROMBOPLASTIN TIME PARTIAL: CPT | Performed by: EMERGENCY MEDICINE

## 2020-01-02 PROCEDURE — 85610 PROTHROMBIN TIME: CPT | Performed by: EMERGENCY MEDICINE

## 2020-01-02 PROCEDURE — 93005 ELECTROCARDIOGRAM TRACING: CPT

## 2020-01-02 RX ORDER — ALBUTEROL SULFATE 2.5 MG/3ML
5 SOLUTION RESPIRATORY (INHALATION) ONCE
Status: COMPLETED | OUTPATIENT
Start: 2020-01-02 | End: 2020-01-02

## 2020-01-02 RX ORDER — CETIRIZINE HYDROCHLORIDE 10 MG/1
10 TABLET ORAL
Status: DISCONTINUED | OUTPATIENT
Start: 2020-01-02 | End: 2020-01-02 | Stop reason: RX

## 2020-01-02 RX ORDER — IPRATROPIUM BROMIDE AND ALBUTEROL SULFATE 2.5; .5 MG/3ML; MG/3ML
3 SOLUTION RESPIRATORY (INHALATION)
Status: DISCONTINUED | OUTPATIENT
Start: 2020-01-02 | End: 2020-01-04 | Stop reason: HOSPADM

## 2020-01-02 RX ORDER — PHENYTOIN SODIUM 100 MG/1
100 CAPSULE, EXTENDED RELEASE ORAL EVERY 12 HOURS SCHEDULED
Status: DISCONTINUED | OUTPATIENT
Start: 2020-01-02 | End: 2020-01-04

## 2020-01-02 RX ORDER — HEPARIN SODIUM 5000 [USP'U]/ML
5000 INJECTION, SOLUTION INTRAVENOUS; SUBCUTANEOUS EVERY 8 HOURS SCHEDULED
Status: DISCONTINUED | OUTPATIENT
Start: 2020-01-02 | End: 2020-01-04 | Stop reason: HOSPADM

## 2020-01-02 RX ORDER — PREDNISONE 20 MG/1
40 TABLET ORAL DAILY
Status: DISCONTINUED | OUTPATIENT
Start: 2020-01-03 | End: 2020-01-03

## 2020-01-02 RX ORDER — METHYLPREDNISOLONE SODIUM SUCCINATE 125 MG/2ML
125 INJECTION, POWDER, LYOPHILIZED, FOR SOLUTION INTRAMUSCULAR; INTRAVENOUS ONCE
Status: COMPLETED | OUTPATIENT
Start: 2020-01-02 | End: 2020-01-02

## 2020-01-02 RX ORDER — ALBUTEROL SULFATE 2.5 MG/3ML
2.5 SOLUTION RESPIRATORY (INHALATION) EVERY 4 HOURS PRN
Status: DISCONTINUED | OUTPATIENT
Start: 2020-01-02 | End: 2020-01-04 | Stop reason: HOSPADM

## 2020-01-02 RX ADMIN — METHYLPREDNISOLONE SODIUM SUCCINATE 125 MG: 125 INJECTION, POWDER, FOR SOLUTION INTRAMUSCULAR; INTRAVENOUS at 17:08

## 2020-01-02 RX ADMIN — THEOPHYLLINE ANHYDROUS 400 MG: 400 CAPSULE, EXTENDED RELEASE ORAL at 22:48

## 2020-01-02 RX ADMIN — PHENYTOIN SODIUM 100 MG: 100 CAPSULE ORAL at 22:57

## 2020-01-02 RX ADMIN — SODIUM CHLORIDE 1000 ML: 0.9 INJECTION, SOLUTION INTRAVENOUS at 17:08

## 2020-01-02 RX ADMIN — IPRATROPIUM BROMIDE 0.5 MG: 0.5 SOLUTION RESPIRATORY (INHALATION) at 18:10

## 2020-01-02 RX ADMIN — CEFTRIAXONE SODIUM 1000 MG: 10 INJECTION, POWDER, FOR SOLUTION INTRAVENOUS at 17:13

## 2020-01-02 RX ADMIN — IPRATROPIUM BROMIDE 0.5 MG: 0.5 SOLUTION RESPIRATORY (INHALATION) at 16:33

## 2020-01-02 RX ADMIN — ALBUTEROL SULFATE 5 MG: 2.5 SOLUTION RESPIRATORY (INHALATION) at 18:09

## 2020-01-02 RX ADMIN — HEPARIN SODIUM 5000 UNITS: 5000 INJECTION INTRAVENOUS; SUBCUTANEOUS at 21:49

## 2020-01-02 RX ADMIN — ALBUTEROL SULFATE 5 MG: 2.5 SOLUTION RESPIRATORY (INHALATION) at 16:33

## 2020-01-02 RX ADMIN — SODIUM CHLORIDE 1000 ML: 0.9 INJECTION, SOLUTION INTRAVENOUS at 19:50

## 2020-01-02 NOTE — ED PROVIDER NOTES
History  Chief Complaint   Patient presents with    Asthma     53-year-old female with history of asthma presenting for evaluation of cough and shortness of breath  Patient states that she was sick at the end of November and December and initially improved, however worse in the past 2-3 days  She states that she has had a cough and feels short of breath  She does have a history of asthma, however rarely as exacerbations and had  albuterol that she used yesterday and today  No recorded fevers at home  She denies any chest pain, abdominal pain, back pain, leg pain or swelling  No known cardiac disease  No history of DVT/PE  Patient saw her family doctor at the beginning of symptoms and was prescribed an antibiotic, unsure what this was at that time, as appointment to see them tomorrow  A/P:  66 year female with cough/SOB, patient with rhonchus/wheezing on exam- DuoNeb, Solu-Medrol, sepsis workup          Prior to Admission Medications   Prescriptions Last Dose Informant Patient Reported? Taking?    Matthew Goodwin [id number] MCG/ACT inhaler   Yes Yes   Sig: Inhale 2 puffs 2 (two) times a day    VENTOLIN  (90 Base) MCG/ACT inhaler   Yes Yes   allopurinol (ZYLOPRIM) 100 mg tablet   Yes Yes   cetirizine (ZyrTEC) 10 mg tablet   Yes Yes   colchicine (COLCRYS) 0 6 mg tablet Not Taking at Unknown time  Yes No   Sig: Take 0 6 mg by mouth as needed for muscle/joint pain (gout flares)   ergocalciferol (VITAMIN D2) 50,000 units   Yes Yes   Sig: TK 1 C PO 1 TIME Q WK   fluticasone (FLONASE) 50 mcg/act nasal spray   Yes Yes   phenytoin (DILANTIN) 100 mg ER capsule   Yes Yes   Sig: Take 100 mg by mouth every 12 (twelve) hours    theophylline (UNIPHYL) 400 mg 24 hr tablet   Yes Yes   Sig: Take 400 mg by mouth 2 (two) times a day    traMADol (ULTRAM) 50 mg tablet Not Taking at Unknown time  No No   Sig: Take 1 tablet (50 mg total) by mouth every 6 (six) hours as needed for moderate pain   Patient not taking: Reported on 1/2/2020      Facility-Administered Medications: None       Past Medical History:   Diagnosis Date    Arthritis     Asthma     Chronic pain disorder     Gout     Seasonal allergies     Seizures (HCC)     petite last unknown    Vitamin D deficiency        Past Surgical History:   Procedure Laterality Date    COLONOSCOPY      HI REVISE MEDIAN N/CARPAL TUNNEL SURG Right 9/5/2019    Procedure: RELEASE CARPAL TUNNEL WRIST;  Surgeon: Анна Patricia MD;  Location: [address] [place] [address];  Service: Orthopedics    TUBAL LIGATION         Family History   Problem Relation Age of Onset    Diabetes Mother     Alzheimer's disease Father     Diabetes Family     Arthritis Family      I have reviewed and agree with the history as documented  Social History     Tobacco Use    Smoking status: Never Smoker    Smokeless tobacco: Never Used   Substance Use Topics    Alcohol use: Yes     Frequency: Never     Comment: rare    Drug use: Never        Review of Systems   Constitutional: Negative for chills, fever and unexpected weight change  HENT: Negative for ear pain, rhinorrhea and sore throat  Eyes: Negative for pain and visual disturbance  Respiratory: Positive for cough and shortness of breath  Cardiovascular: Negative for chest pain and leg swelling  Gastrointestinal: Negative for abdominal pain, constipation, diarrhea, nausea and vomiting  Endocrine: Negative for polydipsia, polyphagia and polyuria  Genitourinary: Negative for dysuria, frequency, hematuria and urgency  Musculoskeletal: Negative for back pain, myalgias and neck pain  Skin: Negative for color change and rash  Allergic/Immunologic: Negative for environmental allergies and immunocompromised state  Neurological: Negative for dizziness, weakness, light-headedness, numbness and headaches  Hematological: Negative for adenopathy  Does not bruise/bleed easily  Psychiatric/Behavioral: Negative for agitation and confusion     All other systems reviewed and are negative  Physical Exam  Physical Exam   Constitutional: She is oriented to person, place, and time  She appears well-developed and well-nourished  HENT:   Head: Normocephalic and atraumatic  Nose: Nose normal    Mouth/Throat: Oropharynx is clear and moist    Eyes: Conjunctivae and EOM are normal    Neck: Normal range of motion  Neck supple  Cardiovascular: Normal rate, regular rhythm, normal heart sounds and intact distal pulses  Pulmonary/Chest: No stridor  She has wheezes  She has no rales  She exhibits no tenderness  Conversational dyspnea, tachypnea, diffuse wheezing/rhonchi   Abdominal: Soft  She exhibits no distension  There is no tenderness  There is no rebound and no guarding  Musculoskeletal: She exhibits no edema or deformity  No calf swelling/ttp, no peripheral edema   Neurological: She is alert and oriented to person, place, and time  She exhibits normal muscle tone  Coordination normal    Skin: Skin is warm and dry  No rash noted  Psychiatric: She has a normal mood and affect  Judgment and thought content normal    Nursing note and vitals reviewed        Vital Signs  ED Triage Vitals [01/02/20 1627]   Temperature Pulse Respirations Blood Pressure SpO2   98 1 °F (36 7 °C) (!) 118 (!) 24 152/69 93 %      Temp Source Heart Rate Source Patient Position - Orthostatic VS BP Location FiO2 (%)   Oral Monitor Sitting Right arm --      Pain Score       No Pain           Vitals:    01/02/20 1627 01/02/20 1737 01/02/20 1915 01/02/20 2113   BP: 152/69 128/60 154/65 127/68   Pulse: (!) 118 (!) 119 (!) 120 (!) 122   Patient Position - Orthostatic VS: Sitting Sitting           Visual Acuity      ED Medications  Medications   theophylline (EMMY-24) 24 hr capsule 400 mg (has no administration in time range)   ipratropium-albuterol (DUO-NEB) 0 5-2 5 mg/3 mL inhalation solution 3 mL (has no administration in time range)   heparin (porcine) subcutaneous injection 5,000 Units (5,000 Units Subcutaneous Given 1/2/20 2149)   predniSONE tablet 40 mg (has no administration in time range)   albuterol inhalation solution 5 mg (5 mg Nebulization Given 1/2/20 1633)   ipratropium (ATROVENT) 0 02 % inhalation solution 0 5 mg (0 5 mg Nebulization Given 1/2/20 1633)   sodium chloride 0 9 % bolus 1,000 mL (0 mL Intravenous Stopped 1/2/20 2038)   methylPREDNISolone sodium succinate (Solu-MEDROL) injection 125 mg (125 mg Intravenous Given 1/2/20 1708)   ceftriaxone (ROCEPHIN) 1 g/50 mL in dextrose IVPB (0 mg Intravenous Stopped 1/2/20 1757)   albuterol inhalation solution 5 mg (5 mg Nebulization Given 1/2/20 1809)   ipratropium (ATROVENT) 0 02 % inhalation solution 0 5 mg (0 5 mg Nebulization Given 1/2/20 1810)   sodium chloride 0 9 % bolus 1,000 mL (1,000 mL Intravenous New Bag 1/2/20 1950)       Diagnostic Studies  Results Reviewed     Procedure Component Value Units Date/Time    Procalcitonin [[id number]]  (Normal) Collected:  01/02/20 1706    Lab Status:  Final result Specimen:  Blood from Arm, Right Updated:  01/02/20 2055     Procalcitonin 0 20 ng/ml     Urine Microscopic [[id number]]  (Abnormal) Collected:  01/02/20 2029    Lab Status:  Final result Specimen:  Urine, Clean Catch Updated:  01/02/20 2050     RBC, UA 1-2 /hpf      WBC, UA 4-10 /hpf      Epithelial Cells Occasional /hpf      Bacteria, UA Occasional /hpf     UA w Reflex to Microscopic w Reflex to Culture [[id number]]  (Abnormal) Collected:  01/02/20 2029    Lab Status:  Final result Specimen:  Urine, Clean Catch Updated:  01/02/20 2043     Color, UA Yellow     Clarity, UA Clear     Specific Gravity, UA 1 010     pH, UA 6 0     Leukocytes, UA Moderate     Nitrite, UA Negative     Protein, UA Negative mg/dl      Glucose, UA Negative mg/dl      Ketones, UA Negative mg/dl      Urobilinogen, UA 0 2 E U /dl      Bilirubin, UA Negative     Blood, UA Trace-Intact    Urine Macroscopic, POC [[id number]]  (Abnormal) Collected:  01/02/20 2016    Lab Status:  Final result Specimen:  Urine Updated:  01/02/20 2017     Color, UA Yellow     Clarity, UA Clear     pH, UA 6 0     Leukocytes, UA Moderate     Nitrite, UA Negative     Protein, UA Negative mg/dl      Glucose, UA Negative mg/dl      Ketones, UA Negative mg/dl      Urobilinogen, UA 0 2 E U /dl      Bilirubin, UA Negative     Blood, UA Trace     Specific [place], UA 1 010    Narrative:       CLINITEK RESULT    Lactic Acid x2 [[id number]]  (Abnormal) Collected:  01/02/20 1906    Lab Status:  Final result Specimen:  Blood from Arm, Left Updated:  01/1943     LACTIC ACID 3 1 mmol/L     Narrative:       Result may be elevated if tourniquet was used during collection      Comprehensive metabolic panel [[id number]]  (Abnormal) Collected:  01/02/20 1706    Lab Status:  Final result Specimen:  Blood from Arm, Right Updated:  01/02/20 1757     Sodium 142 mmol/L      Potassium 4 1 mmol/L      Chloride 103 mmol/L      CO2 25 mmol/L      ANION GAP 14 mmol/L      BUN 13 mg/dL      Creatinine 0 95 mg/dL      Glucose 84 mg/dL      Calcium 9 6 mg/dL      AST 20 U/L      ALT 23 U/L      Alkaline Phosphatase 170 U/L      Total Protein 7 7 g/dL      Albumin 4 2 g/dL      Total Bilirubin 0 35 mg/dL      eGFR 58 ml/min/1 73sq m     Narrative:       [place] guidelines for Chronic Kidney Disease (CKD):     Stage 1 with normal or high GFR (GFR > 90 mL/min/1 73 square meters)    Stage 2 Mild CKD (GFR = 60-89 mL/min/1 73 square meters)    Stage 3A Moderate CKD (GFR = 45-59 mL/min/1 73 square meters)    Stage 3B Moderate CKD (GFR = 30-44 mL/min/1 73 square meters)    Stage 4 Severe CKD (GFR = 15-29 mL/min/1 73 square meters)    Stage 5 End Stage CKD (GFR <15 mL/min/1 73 square meters)  Note: GFR calculation is accurate only with a steady state creatinine    Influenza A/B and RSV PCR [[id number]]  (Abnormal) Collected:  01/02/20 1713    Lab Status:  Final result Specimen:  Nasopharyngeal Swab Updated: 01/02/20 1756     INFLUENZA A PCR None Detected     INFLUENZA B PCR None Detected     RSV PCR Detected    Protime-INR [[id number]]  (Normal) Collected:  01/02/20 1706    Lab Status:  Final result Specimen:  Blood from Arm, Right Updated:  01/02/20 1755     Protime 13 9 seconds      INR 1 06    APTT [[id number]]  (Normal) Collected:  01/02/20 1706    Lab Status:  Final result Specimen:  Blood from Arm, Right Updated:  01/02/20 1755     PTT 28 seconds     Lactic Acid x2 [[id number]]  (Abnormal) Collected:  01/02/20 1706    Lab Status:  Final result Specimen:  Blood from Arm, Right Updated:  01/02/20 1749     LACTIC ACID 2 2 mmol/L     Narrative:       Result may be elevated if tourniquet was used during collection  Troponin I [[id number]]  (Normal) Collected:  01/02/20 1706    Lab Status:  Final result Specimen:  Blood from Arm, Right Updated:  01/02/20 1736     Troponin I <0 02 ng/mL     CBC and differential [[id number]]  (Abnormal) Collected:  01/02/20 1706    Lab Status:  Final result Specimen:  Blood from Arm, Right Updated:  01/02/20 1718     WBC 11 00 Thousand/uL      RBC 4 35 Million/uL      Hemoglobin 14 1 g/dL      Hematocrit 42 0 %      MCV 97 fL      MCH 32 4 pg      MCHC 33 6 g/dL      RDW 13 2 %      MPV 9 3 fL      Platelets 598 Thousands/uL      nRBC 0 /100 WBCs      Neutrophils Relative 70 %      Immat GRANS % 1 %      Lymphocytes Relative 15 %      Monocytes Relative 8 %      Eosinophils Relative 5 %      Basophils Relative 1 %      Neutrophils Absolute 7 82 Thousands/µL      Immature Grans Absolute 0 05 Thousand/uL      Lymphocytes Absolute 1 64 Thousands/µL      Monocytes Absolute 0 92 Thousand/µL      Eosinophils Absolute 0 49 Thousand/µL      Basophils Absolute 0 08 Thousands/µL     Blood culture #2 [[id number]] Collected:  01/02/20 1701    Lab Status: In process Specimen:  Blood from Arm, Left Updated:  01/02/20 1715    Blood culture #1 [[id number]] Collected:  01/02/20 1650    Lab Status:   In process Specimen:  Blood from Arm, Left Updated:  01/02/20 1653                 CT chest without contrast   ED Interpretation by Roc Mills DO (01/02 1944)   IMPRESSION:       Near complete right middle lobe atelectasis without evidence of endobronchial lesion, 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        No other significant intrathoracic abnormality  Final Result by Hemalatha Lechuga MD (01/02 1935)      Near complete right middle lobe atelectasis without evidence of endobronchial lesion, 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  No other significant intrathoracic abnormality  Workstation performed: [id number]         XR chest 2 views   ED Interpretation by Roc Mills DO (01/02 1820)   IMPRESSION:       Right middle lobe atelectasis  No prior imaging is available  Recommend further evaluation with chest CT        Partial eventration of the right hemidiaphragm  Final Result by Hemalatha Lechuga MD (01/02 1815)      Right middle lobe atelectasis  No prior imaging is available  Recommend further evaluation with chest CT  Partial eventration of the right hemidiaphragm  The study was marked in [facility] for immediate notification  Workstation performed: [id number]                    Procedures  Procedures         ED Course  ED Course as of Jan 02 2156   Thu Jan 02, 2020   1645 Pulse(!): 118   1645 Respirations(!): 24   1652 Pt with 2 SIRS criteria and concern for PNA- will start abx with Ceftriaxone      1720 EKG: MAT @ 120 bpm, normal axis, nonspecific ST/ T wave cabn      1806 RSV PCR(!): Detected   1806 LACTIC ACID(!!): 2 2 1828 Patient is still with persistent rhonchi/wheezing after 1st neb, 2nd in progress, updated regarding results and plan    Pending CT chest and that admission      1944 Likely from albuterol   LACTIC ACID(!!): 3 1                   Initial Sepsis Screening     Row Name 01/02/20 1834 01/02/20 1645             Is the patient's history suggestive of a new or worsening infection?   (!) Yes (Proceed)  -[address]       Suspected source of infection    pneumonia  -KH       Are two or more of the following signs & symptoms of infection both present and new to the patient?   (!) Yes (Proceed)  -KH       Indicate SIRS criteria    Tachycardia > 90 bpm;Tachypnea > 20 resp per min  -KH       If the answer is yes to both questions, suspicion of sepsis is present  [de-identified]         If severe sepsis is present AND tissue hypoperfusion perists in the hour after fluid resuscitation or lactate > 4, the patient meets criteria for SEPTIC SHOCK           Are any of the following organ dysfunction criteria present within 6 hours of suspected infection and SIRS criteria that are NOT considered to be chronic conditions? (!) Yes  -KH         Organ dysfunction  Lactate > 2 0 mmol/L  -         Date of presentation of severe sepsis  01/02/20  Critical access hospital         Time of presentation of severe sepsis  1834 -[address]         Tissue hypoperfusion persists in the hour after crystalloid fluid administration, evidenced, by either:           Was hypotension present within one hour of the conclusion of crystalloid fluid administration? Adelina Foster       Date of presentation of septic shock           Time of presentation of septic shock             User Key  (r) = Recorded By, (t) = Taken By, (c) = Cosigned By    [address] Name Provider Type    [address] [place] [address],  Physician                  MDM  Number of Diagnoses or Management Options  Asthma exacerbation:   Atelectasis of right lung:   RSV infection:   Diagnosis management comments: 67 yo F with cough/SOB, likely viral bronchitis from RSV complicated by R lung atelectasis and asthma exacerbation   Admitted for further management       Amount and/or Complexity of Data Reviewed  Clinical lab tests: ordered and reviewed  Tests in the radiology section of CPT®: ordered and reviewed  Tests in the medicine section of CPT®: ordered and reviewed  Review and summarize past medical records: yes  Independent visualization of images, tracings, or specimens: yes          Disposition  Final diagnoses:   Atelectasis of right lung   RSV infection   Asthma exacerbation     Time reflects when diagnosis was documented in both MDM as applicable and the Disposition within this note     Time User Action Codes Description Comment    1/2/2020  7:53 PM Brittani Iniguez Add [T62 22] Atelectasis of right lung     1/2/2020  7:53 PM Vic SCHULTZ Add [B97 4] RSV infection     1/2/2020  7:53 PM Vic SCHULTZ Add [J45 901] Asthma exacerbation       ED Disposition     ED Disposition Condition Date/Time Comment    Admit Stable Thu Jan 2, 2020  7:53 PM Case was discussed with KENNY and the patient's admission status was agreed to be Admission Status: inpatient status to the service of Dr Js Pryor   Follow-up Information    None         Current Discharge Medication List      CONTINUE these medications which have NOT CHANGED    Details   ADVAIR -21 MCG/ACT inhaler Inhale 2 puffs 2 (two) times a day       allopurinol (ZYLOPRIM) 100 mg tablet       cetirizine (ZyrTEC) 10 mg tablet       ergocalciferol (VITAMIN D2) 50,000 units TK 1 C PO 1 TIME Q WK  Refills: 0      fluticasone (FLONASE) 50 mcg/act nasal spray       phenytoin (DILANTIN) 100 mg ER capsule Take 100 mg by mouth every 12 (twelve) hours       theophylline (UNIPHYL) 400 mg 24 hr tablet Take 400 mg by mouth 2 (two) times a day       VENTOLIN  (90 Base) MCG/ACT inhaler     Comments: Substitution to a formulary equivalent within the same pharmaceutical class is authorized        colchicine (COLCRYS) 0 6 mg tablet Take 0 6 mg by mouth as needed for muscle/joint pain (gout flares)      traMADol (ULTRAM) 50 mg tablet Take 1 tablet (50 mg total) by mouth every 6 (six) hours as needed for moderate pain  Qty: 12 tablet, Refills: 0    Associated Diagnoses: Carpal tunnel syndrome on right           No discharge procedures on file      ED Provider  Electronically Signed by           Ni Cross DO  01/02/20 2153

## 2020-01-02 NOTE — ED NOTES
Pt ambulatory to the bathroom with steady gait at this time to provide urine specimen        Grzegorz Dick RN  01/02/20 1998

## 2020-01-02 NOTE — ED NOTES
Per Dr Abel Young antibiotics can be hung without obtaining urine specimen        Grzegorz Dick RN  01/02/20 6924

## 2020-01-03 LAB
ANION GAP SERPL CALCULATED.3IONS-SCNC: 11 MMOL/L (ref 4–13)
ATRIAL RATE: 120 BPM
BASOPHILS # BLD AUTO: 0.05 THOUSANDS/ΜL (ref 0–0.1)
BASOPHILS NFR BLD AUTO: 1 % (ref 0–1)
BUN SERPL-MCNC: 11 MG/DL (ref 5–25)
CALCIUM SERPL-MCNC: 9 MG/DL (ref 8.3–10.1)
CHLORIDE SERPL-SCNC: 105 MMOL/L (ref 100–108)
CO2 SERPL-SCNC: 24 MMOL/L (ref 21–32)
CREAT SERPL-MCNC: 0.77 MG/DL (ref 0.6–1.3)
EOSINOPHIL # BLD AUTO: 0.12 THOUSAND/ΜL (ref 0–0.61)
EOSINOPHIL NFR BLD AUTO: 2 % (ref 0–6)
ERYTHROCYTE [DISTWIDTH] IN BLOOD BY AUTOMATED COUNT: 13.4 % (ref 11.6–15.1)
GFR SERPL CREATININE-BSD FRML MDRD: 75 ML/MIN/1.73SQ M
GLUCOSE SERPL-MCNC: 95 MG/DL (ref 65–140)
HCT VFR BLD AUTO: 34.9 % (ref 34.8–46.1)
HGB BLD-MCNC: 11.7 G/DL (ref 11.5–15.4)
IMM GRANULOCYTES # BLD AUTO: 0.03 THOUSAND/UL (ref 0–0.2)
IMM GRANULOCYTES NFR BLD AUTO: 0 % (ref 0–2)
LACTATE SERPL-SCNC: 1.6 MMOL/L (ref 0.5–2)
LYMPHOCYTES # BLD AUTO: 1.77 THOUSANDS/ΜL (ref 0.6–4.47)
LYMPHOCYTES NFR BLD AUTO: 22 % (ref 14–44)
MCH RBC QN AUTO: 32.6 PG (ref 26.8–34.3)
MCHC RBC AUTO-ENTMCNC: 33.5 G/DL (ref 31.4–37.4)
MCV RBC AUTO: 97 FL (ref 82–98)
MONOCYTES # BLD AUTO: 0.67 THOUSAND/ΜL (ref 0.17–1.22)
MONOCYTES NFR BLD AUTO: 8 % (ref 4–12)
NEUTROPHILS # BLD AUTO: 5.59 THOUSANDS/ΜL (ref 1.85–7.62)
NEUTS SEG NFR BLD AUTO: 67 % (ref 43–75)
NRBC BLD AUTO-RTO: 0 /100 WBCS
P AXIS: -30 DEGREES
PLATELET # BLD AUTO: 291 THOUSANDS/UL (ref 149–390)
PMV BLD AUTO: 9.6 FL (ref 8.9–12.7)
POTASSIUM SERPL-SCNC: 3.9 MMOL/L (ref 3.5–5.3)
PR INTERVAL: 136 MS
QRS AXIS: 91 DEGREES
QRSD INTERVAL: 70 MS
QT INTERVAL: 308 MS
QTC INTERVAL: 435 MS
RBC # BLD AUTO: 3.59 MILLION/UL (ref 3.81–5.12)
SODIUM SERPL-SCNC: 140 MMOL/L (ref 136–145)
T WAVE AXIS: 66 DEGREES
VENTRICULAR RATE: 120 BPM
WBC # BLD AUTO: 8.23 THOUSAND/UL (ref 4.31–10.16)

## 2020-01-03 PROCEDURE — 83605 ASSAY OF LACTIC ACID: CPT | Performed by: INTERNAL MEDICINE

## 2020-01-03 PROCEDURE — 94640 AIRWAY INHALATION TREATMENT: CPT

## 2020-01-03 PROCEDURE — 99232 SBSQ HOSP IP/OBS MODERATE 35: CPT | Performed by: HOSPITALIST

## 2020-01-03 PROCEDURE — 80048 BASIC METABOLIC PNL TOTAL CA: CPT | Performed by: INTERNAL MEDICINE

## 2020-01-03 PROCEDURE — 85025 COMPLETE CBC W/AUTO DIFF WBC: CPT | Performed by: INTERNAL MEDICINE

## 2020-01-03 PROCEDURE — 94760 N-INVAS EAR/PLS OXIMETRY 1: CPT

## 2020-01-03 PROCEDURE — 93010 ELECTROCARDIOGRAM REPORT: CPT | Performed by: INTERNAL MEDICINE

## 2020-01-03 RX ORDER — FLUTICASONE PROPIONATE 50 MCG
1 SPRAY, SUSPENSION (ML) NASAL DAILY
Status: DISCONTINUED | OUTPATIENT
Start: 2020-01-04 | End: 2020-01-03

## 2020-01-03 RX ORDER — METHYLPREDNISOLONE SODIUM SUCCINATE 125 MG/2ML
60 INJECTION, POWDER, LYOPHILIZED, FOR SOLUTION INTRAMUSCULAR; INTRAVENOUS EVERY 6 HOURS SCHEDULED
Status: DISCONTINUED | OUTPATIENT
Start: 2020-01-03 | End: 2020-01-04 | Stop reason: HOSPADM

## 2020-01-03 RX ORDER — ECHINACEA PURPUREA EXTRACT 125 MG
1 TABLET ORAL
Status: DISCONTINUED | OUTPATIENT
Start: 2020-01-03 | End: 2020-01-04 | Stop reason: HOSPADM

## 2020-01-03 RX ORDER — FLUTICASONE PROPIONATE 50 MCG
1 SPRAY, SUSPENSION (ML) NASAL DAILY
Status: DISCONTINUED | OUTPATIENT
Start: 2020-01-03 | End: 2020-01-04 | Stop reason: HOSPADM

## 2020-01-03 RX ADMIN — METHYLPREDNISOLONE SODIUM SUCCINATE 60 MG: 125 INJECTION, POWDER, FOR SOLUTION INTRAMUSCULAR; INTRAVENOUS at 18:22

## 2020-01-03 RX ADMIN — IPRATROPIUM BROMIDE AND ALBUTEROL SULFATE 3 ML: 2.5; .5 SOLUTION RESPIRATORY (INHALATION) at 07:38

## 2020-01-03 RX ADMIN — THEOPHYLLINE ANHYDROUS 400 MG: 400 CAPSULE, EXTENDED RELEASE ORAL at 08:39

## 2020-01-03 RX ADMIN — THEOPHYLLINE ANHYDROUS 400 MG: 400 CAPSULE, EXTENDED RELEASE ORAL at 18:22

## 2020-01-03 RX ADMIN — IPRATROPIUM BROMIDE AND ALBUTEROL SULFATE 3 ML: 2.5; .5 SOLUTION RESPIRATORY (INHALATION) at 18:42

## 2020-01-03 RX ADMIN — HEPARIN SODIUM 5000 UNITS: 5000 INJECTION INTRAVENOUS; SUBCUTANEOUS at 21:57

## 2020-01-03 RX ADMIN — PREDNISONE 40 MG: 20 TABLET ORAL at 08:39

## 2020-01-03 RX ADMIN — ALBUTEROL SULFATE 2.5 MG: 2.5 SOLUTION RESPIRATORY (INHALATION) at 09:50

## 2020-01-03 RX ADMIN — HEPARIN SODIUM 5000 UNITS: 5000 INJECTION INTRAVENOUS; SUBCUTANEOUS at 13:08

## 2020-01-03 RX ADMIN — PHENYTOIN SODIUM 100 MG: 100 CAPSULE ORAL at 21:57

## 2020-01-03 RX ADMIN — PHENYTOIN SODIUM 100 MG: 100 CAPSULE ORAL at 08:39

## 2020-01-03 RX ADMIN — IPRATROPIUM BROMIDE AND ALBUTEROL SULFATE 3 ML: 2.5; .5 SOLUTION RESPIRATORY (INHALATION) at 00:12

## 2020-01-03 RX ADMIN — FLUTICASONE PROPIONATE 1 SPRAY: 50 SPRAY, METERED NASAL at 21:57

## 2020-01-03 RX ADMIN — IPRATROPIUM BROMIDE AND ALBUTEROL SULFATE 3 ML: 2.5; .5 SOLUTION RESPIRATORY (INHALATION) at 13:02

## 2020-01-03 RX ADMIN — HEPARIN SODIUM 5000 UNITS: 5000 INJECTION INTRAVENOUS; SUBCUTANEOUS at 05:40

## 2020-01-03 NOTE — H&P
H&P- Falguni Arrieta 1945, 66 y o  female MRN: [id number]    Unit/Bed#: ED 27 Encounter: [id number]    Primary Care Provider: Jessie Beltran DO   Date and time admitted to hospital: 1/2/2020  4:25 PM    * Asthma exacerbatio  Assessment & Plan  Presents with cough, wheezing, chest tightness, history of asthma  RSV positive  Continue bronchodilators scheduled q 6 hours  Prednisolone 40 mg daily  Monitor clinical response tomorrow and plan discharge  Right middle lobe syndrome  Assessment & Plan  Right middle lobe syndrome with atelectasis without evidence of endobronchial lesion  Incentive spirometry  Bronchodilator  Repeat chest x-ray tomorrow  Carpal tunnel syndrome on right  Assessment & Plan  Follows with Orthopedics as an outpatient  Pain management per pain scale  Epilepsy [facility])  Assessment & Plan  History of epilepsy on Dilantin  Continue home management  VTE Prophylaxis: Heparin  / sequential compression device   Code Status:  Full code  POLST: There is no POLST form on file for this patient (pre-hospital)    Anticipated Length of Stay:  Patient will be admitted on an Inpatient basis with an anticipated length of stay of  greater than 2 midnights  Justification for Hospital Stay:  Asthma exacerbation    Total Time for Visit, including Counseling / Coordination of Care: 45 minutes  Greater than 50% of this total time spent on direct patient counseling and coordination of care  History of Present Illness:    Falguni Arrieta is a 66 y o  female who presents with cough, with, chest tightness for the last few days  History of aspect past with remote exacerbation  Patient 1st presented with upper respiratory tract like symptoms which follows with under admitting cough and wheezing  Past medical history significant for epilepsy, carpal tunnel syndrome  Review of Systems:    Review of Systems   Constitutional: Negative  HENT: Negative  Eyes: Negative      Respiratory: Positive for cough, chest tightness and wheezing  Negative for apnea, choking, shortness of breath and stridor  Cardiovascular: Negative  Gastrointestinal: Negative  Endocrine: Negative  Genitourinary: Negative  Musculoskeletal: Negative  Neurological: Negative  Psychiatric/Behavioral: Negative  Past Medical and Surgical History:     Past Medical History:   Diagnosis Date    Arthritis     Asthma     Chronic pain disorder     Gout     Seasonal allergies     Seizures (HCC)     petite last unknown    Vitamin D deficiency        Past Surgical History:   Procedure Laterality Date    COLONOSCOPY      RI REVISE MEDIAN N/CARPAL TUNNEL SURG Right 9/5/2019    Procedure: RELEASE CARPAL TUNNEL WRIST;  Surgeon: Isela Cheney MD;  Location: [address] [place] [address];  Service: Orthopedics    TUBAL LIGATION         Meds/Allergies:    Prior to Admission medications    Medication Sig Start Date End Date Taking?  Authorizing Provider   ADVAIR -21 MCG/ACT inhaler Inhale 2 puffs 2 (two) times a day  7/2/19  Yes Historical Provider, MD   allopurinol (ZYLOPRIM) 100 mg tablet  4/30/19  Yes Historical Provider, MD   cetirizine (ZyrTEC) 10 mg tablet  7/2/19  Yes Historical Provider, MD   ergocalciferol (VITAMIN D2) 50,000 units TK 1 C PO 1 TIME Q WK 6/18/19  Yes Historical Provider, MD   fluticasone (FLONASE) 50 mcg/act nasal spray  7/2/19  Yes Historical Provider, MD   phenytoin (DILANTIN) 100 mg ER capsule Take 100 mg by mouth every 12 (twelve) hours  5/25/19  Yes Historical Provider, MD   theophylline (UNIPHYL) 400 mg 24 hr tablet Take 400 mg by mouth 2 (two) times a day  7/8/19  Yes Historical Provider, MD   VENTOLIN  (90 Base) MCG/ACT inhaler  7/2/19  Yes Historical Provider, MD   colchicine (COLCRYS) 0 6 mg tablet Take 0 6 mg by mouth as needed for muscle/joint pain (gout flares)    Historical Provider, MD   traMADol (ULTRAM) 50 mg tablet Take 1 tablet (50 mg total) by mouth every 6 (six) hours as needed for moderate pain  Patient not taking: Reported on 1/2/2020 9/6/19   José Antonio Momin MD     I have reviewed home medications with patient personally  Allergies: Allergies   Allergen Reactions    Tetracycline GI Intolerance       Social History:     Substance Use History:   Social History     Substance and Sexual Activity   Alcohol Use Yes    Frequency: Never    Comment: rare     Social History     Tobacco Use   Smoking Status Never Smoker   Smokeless Tobacco Never Used     Social History     Substance and Sexual Activity   Drug Use Never       Family History:    non-contributory    Physical Exam:     Vitals:   Blood Pressure: 154/65 (01/02/20 1915)  Pulse: (!) 120 (01/02/20 1915)  Temperature: 98 1 °F (36 7 °C) (01/02/20 1627)  Temp Source: Oral (01/02/20 1627)  Respirations: 19 (01/02/20 1915)  Weight - Scale: 78 9 kg (173 lb 15 1 oz) (01/02/20 1627)  SpO2: 95 % (01/02/20 1915)    Physical Exam    General appearance: alert, appears stated age and cooperative  Skin: Skin color, texture, turgor normal  No rashes or lesions  Head: Normocephalic, without obvious abnormality, atraumatic  Eyes: conjunctivae/corneas clear  PERRL, EOM's intact  Lungs: Bilateral diffuse wheeze and prolonged expiration  Heart: regular rate and rhythm, S1, S2 normal, no murmur, click, rub or gallop  Abdomen: soft, non-tender; bowel sounds normal; no masses,  no organomegaly  Back: symmetric, no curvature  ROM normal  No CVA tenderness  Extremities: extremities normal, atraumatic, no cyanosis or edema  Neurologic: Alert and oriented X 3, normal strength and tone  Normal symmetric reflexes  Normal coordination and gait  Psychiatric:  Normal mood and affect    Additional Data:     Lab Results: I have personally reviewed pertinent reports        Results from last 7 days   Lab Units 01/02/20  1706   WBC Thousand/uL 11 00*   HEMOGLOBIN g/dL 14 1   HEMATOCRIT % 42 0   PLATELETS Thousands/uL 353   NEUTROS PCT % 70   LYMPHS PCT % 15   MONOS PCT % 8   EOS PCT % 5     Results from last 7 days   Lab Units 01/02/20  1706   SODIUM mmol/L 142   POTASSIUM mmol/L 4 1   CHLORIDE mmol/L 103   CO2 mmol/L 25   BUN mg/dL 13   CREATININE mg/dL 0 95   ANION GAP mmol/L 14*   CALCIUM mg/dL 9 6   ALBUMIN g/dL 4 2   TOTAL BILIRUBIN mg/dL 0 35   ALK PHOS U/L 170*   ALT U/L 23   AST U/L 20   GLUCOSE RANDOM mg/dL 84     Results from last 7 days   Lab Units 01/02/20  1706   INR  1 06             Results from last 7 days   Lab Units 01/02/20  1906 01/02/20  1706   LACTIC ACID mmol/L 3 1* 2 2*       Imaging: I have personally reviewed pertinent reports  CT chest without contrast   ED Interpretation by Rubén Ackerman DO (01/02 1944)   IMPRESSION:       Near complete right middle lobe atelectasis without evidence of endobronchial lesion, 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        No other significant intrathoracic abnormality  Final Result by Driss He MD (01/02 1935)      Near complete right middle lobe atelectasis without evidence of endobronchial lesion, 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  No other significant intrathoracic abnormality  Workstation performed: [id number]         XR chest 2 views   ED Interpretation by Rubén Ackerman DO (01/02 1820)   IMPRESSION:       Right middle lobe atelectasis  No prior imaging is available  Recommend further evaluation with chest CT        Partial eventration of the right hemidiaphragm  Final Result by Driss He MD (01/02 1815)      Right middle lobe atelectasis  No prior imaging is available  Recommend further evaluation with chest CT  Partial eventration of the right hemidiaphragm  The study was marked in [facility] for immediate notification                       Workstation performed: [id number]             EKG, Pathology, and Other Studies Reviewed on Admission: yes    Allscripts / Epic Records Reviewed: Yes     ** Please Note: This note has been constructed using a voice recognition system   **

## 2020-01-03 NOTE — ASSESSMENT & PLAN NOTE
Right middle lobe syndrome with atelectasis without evidence of endobronchial lesion  Incentive spirometry  Bronchodilator  Repeat chest x-ray tomorrow

## 2020-01-03 NOTE — PROGRESS NOTES
Progress Note - Anthony Maryuris 1945, 66 y o  female MRN: [id number]    [facility] [address] [id number] Encounter: [id number]    Primary Care Provider: Mikala Carolina DO   Date and time admitted to hospital: 2020  4:25 PM        * Asthma exacerbation  Assessment & Plan  Still not doing great  Will change to IV solumedrol  Continue nebtx            Subjective:   Still very tired  Still wheezing  Objective:     Vitals:   Temp (24hrs), Av 1 °F (36 7 °C), Min:98 1 °F (36 7 °C), Max:98 1 °F (36 7 °C)    Temp:  [98 1 °F (36 7 °C)] 98 1 °F (36 7 °C)  HR:  [] 98  Resp:  [18-24] 20  BP: (127-154)/(60-72) 143/67  SpO2:  [92 %-96 %] 94 %  Body mass index is 31 31 kg/m²  Input and Output Summary (last 24 hours): Intake/Output Summary (Last 24 hours) at 1/3/2020 1447  Last data filed at 2020 2038  Gross per 24 hour   Intake 1073 33 ml   Output    Net 1073 33 ml       Physical Exam:     Physical Exam   HENT:   Head: Normocephalic and atraumatic  Eyes: Pupils are equal, round, and reactive to light  EOM are normal    Cardiovascular: Normal rate and regular rhythm  Exam reveals no gallop and no friction rub  No murmur heard  Pulmonary/Chest: She has wheezes (moderate bilat inspiratory and expiratory wheeze)  She has no rales  Abdominal: Soft  Bowel sounds are normal  There is no tenderness  Musculoskeletal: She exhibits no edema  Nursing note and vitals reviewed              Additional Data:     Labs:    Results from last 7 days   Lab Units 20  0549   WBC Thousand/uL 8 23   HEMOGLOBIN g/dL 11 7   HEMATOCRIT % 34 9   PLATELETS Thousands/uL 291   NEUTROS PCT % 67   LYMPHS PCT % 22   MONOS PCT % 8   EOS PCT % 2     Results from last 7 days   Lab Units 20  0542 20  1706   POTASSIUM mmol/L 3 9 4 1   CHLORIDE mmol/L 105 103   CO2 mmol/L 24 25   BUN mg/dL 11 13   CREATININE mg/dL 0 77 0 95   CALCIUM mg/dL 9 0 9 6   ALK PHOS U/L  --  170*   ALT U/L  --  23   AST U/L  --  20 Results from last 7 days   Lab Units 01/02/20  1706   INR  1 06                   * I Have Reviewed All Lab Data     Recent Cultures (last 7 days):     Results from last 7 days   Lab Units 01/02/20  1701 01/02/20  1650   BLOOD CULTURE  Received in Microbiology Lab  Culture in Progress  Received in Microbiology Lab  Culture in Progress  Last 24 Hours Medication List:     Current Facility-Administered Medications:  albuterol 2 5 mg Nebulization Q4H PRN SHONA Hay   heparin (porcine) 5,000 Units Subcutaneous [facility] Trip Grimm MD   ipratropium-albuterol 3 mL Nebulization Q6H Trip Grimm MD   methylPREDNISolone sodium succinate 60 mg Intravenous Q6H [address] Jaylon Bennett DO   phenytoin 100 mg Oral Q12H [address] SHONA Hay   theophylline 400 mg Oral BID Trip Grimm MD         VTE Pharmacologic Prophylaxis:   Pharmacologic: Heparin      Current Length of Stay: 1 day(s)    Current Patient Status: Inpatient       Discharge Plan: home likely tomorrow    Code Status: Level 1 - Full Code           Today, Patient Was Seen By: Yovana Camacho DO    ** Please Note: Dictation voice to text software may have been used in the creation of this document   **

## 2020-01-03 NOTE — PLAN OF CARE
Problem: Potential for Falls  Goal: Patient will remain free of falls  Description  INTERVENTIONS:  - Assess patient frequently for physical needs  -  Identify cognitive and physical deficits and behaviors that affect risk of falls    -  [place] fall precautions as indicated by assessment   - Educate patient/family on patient safety including physical limitations  - Instruct patient to call for assistance with activity based on assessment  - Modify environment to reduce risk of injury  - Consider OT/PT consult to assist with strengthening/mobility  Outcome: Progressing     Problem: PAIN - ADULT  Goal: Verbalizes/displays adequate comfort level or baseline comfort level  Description  Interventions:  - Encourage patient to monitor pain and request assistance  - Assess pain using appropriate pain scale  - Administer analgesics based on type and severity of pain and evaluate response  - Implement non-pharmacological measures as appropriate and evaluate response  - Consider cultural and social influences on pain and pain management  - Notify physician/advanced practitioner if interventions unsuccessful or patient reports new pain  Outcome: Progressing     Problem: INFECTION - ADULT  Goal: Absence or prevention of progression during hospitalization  Description  INTERVENTIONS:  - Assess and monitor for signs and symptoms of infection  - Monitor lab/diagnostic results  - Monitor all insertion sites, i e  indwelling lines, tubes, and drains  - Monitor endotracheal if appropriate and nasal secretions for changes in amount and color  - [place] appropriate cooling/warming therapies per order  - Administer medications as ordered  - Instruct and encourage patient and family to use good hand hygiene technique  - Identify and instruct in appropriate isolation precautions for identified infection/condition  Outcome: Progressing  Goal: Absence of fever/infection during neutropenic period  Description  INTERVENTIONS:  - Monitor WBC    Outcome: Progressing     Problem: SAFETY ADULT  Goal: Maintain or return to baseline ADL function  Description  INTERVENTIONS:  -  Assess patient's ability to carry out ADLs; assess patient's baseline for ADL function and identify physical deficits which impact ability to perform ADLs (bathing, care of mouth/teeth, toileting, grooming, dressing, etc )  - Assess/evaluate cause of self-care deficits   - Assess range of motion  - Assess patient's mobility; develop plan if impaired  - Assess patient's need for assistive devices and provide as appropriate  - Encourage maximum independence but intervene and supervise when necessary  - Involve family in performance of ADLs  - Assess for home care needs following discharge   - Consider OT consult to assist with ADL evaluation and planning for discharge  - Provide patient education as appropriate  Outcome: Progressing  Goal: Maintain or return mobility status to optimal level  Description  INTERVENTIONS:  - Assess patient's baseline mobility status (ambulation, transfers, stairs, etc )    - Identify cognitive and physical deficits and behaviors that affect mobility  - Identify mobility aids required to assist with transfers and/or ambulation (gait belt, sit-to-stand, lift, walker, cane, etc )  - [place] fall precautions as indicated by assessment  - Record patient progress and toleration of activity level on Mobility SBAR; progress patient to next Phase/Stage  - Instruct patient to call for assistance with activity based on assessment  - Consider rehabilitation consult to assist with strengthening/weightbearing, etc   Outcome: Progressing     Problem: DISCHARGE PLANNING  Goal: Discharge to home or other facility with appropriate resources  Description  INTERVENTIONS:  - Identify barriers to discharge w/patient and caregiver  - Arrange for needed discharge resources and transportation as appropriate  - Identify discharge learning needs (meds, wound care, etc )  - Arrange for interpretive services to assist at discharge as needed  - Refer to Case Management Department for coordinating discharge planning if the patient needs post-hospital services based on physician/advanced practitioner order or complex needs related to functional status, cognitive ability, or social support system  Outcome: Progressing

## 2020-01-03 NOTE — ASSESSMENT & PLAN NOTE
Presents with cough, wheezing, chest tightness, history of asthma  RSV positive  Continue bronchodilators scheduled q 6 hours    Monitor clinical response tomorrow and plan discharge

## 2020-01-04 VITALS
DIASTOLIC BLOOD PRESSURE: 73 MMHG | OXYGEN SATURATION: 95 % | BODY MASS INDEX: 31.31 KG/M2 | WEIGHT: 173.94 LBS | HEART RATE: 95 BPM | TEMPERATURE: 97.8 F | SYSTOLIC BLOOD PRESSURE: 122 MMHG | RESPIRATION RATE: 18 BRPM

## 2020-01-04 PROCEDURE — 99239 HOSP IP/OBS DSCHRG MGMT >30: CPT | Performed by: HOSPITALIST

## 2020-01-04 PROCEDURE — 94640 AIRWAY INHALATION TREATMENT: CPT

## 2020-01-04 PROCEDURE — 94760 N-INVAS EAR/PLS OXIMETRY 1: CPT

## 2020-01-04 RX ORDER — PHENYTOIN SODIUM 100 MG/1
100 CAPSULE, EXTENDED RELEASE ORAL ONCE
Status: COMPLETED | OUTPATIENT
Start: 2020-01-04 | End: 2020-01-04

## 2020-01-04 RX ORDER — PREDNISONE 10 MG/1
TABLET ORAL
Qty: 42 TABLET | Refills: 0 | Status: SHIPPED | OUTPATIENT
Start: 2020-01-04

## 2020-01-04 RX ORDER — PHENYTOIN SODIUM 200 MG/1
200 CAPSULE, EXTENDED RELEASE ORAL EVERY 12 HOURS SCHEDULED
Refills: 0
Start: 2020-01-04

## 2020-01-04 RX ORDER — ALBUTEROL SULFATE 2.5 MG/3ML
2.5 SOLUTION RESPIRATORY (INHALATION) EVERY 4 HOURS PRN
Qty: 100 VIAL | Refills: 3 | Status: SHIPPED | OUTPATIENT
Start: 2020-01-04

## 2020-01-04 RX ORDER — PHENYTOIN SODIUM 100 MG/1
200 CAPSULE, EXTENDED RELEASE ORAL EVERY 12 HOURS SCHEDULED
Status: DISCONTINUED | OUTPATIENT
Start: 2020-01-04 | End: 2020-01-04 | Stop reason: HOSPADM

## 2020-01-04 RX ADMIN — FLUTICASONE PROPIONATE 1 SPRAY: 50 SPRAY, METERED NASAL at 08:43

## 2020-01-04 RX ADMIN — METHYLPREDNISOLONE SODIUM SUCCINATE 60 MG: 125 INJECTION, POWDER, FOR SOLUTION INTRAMUSCULAR; INTRAVENOUS at 05:55

## 2020-01-04 RX ADMIN — THEOPHYLLINE ANHYDROUS 400 MG: 400 CAPSULE, EXTENDED RELEASE ORAL at 09:47

## 2020-01-04 RX ADMIN — PHENYTOIN SODIUM 100 MG: 100 CAPSULE ORAL at 09:47

## 2020-01-04 RX ADMIN — HEPARIN SODIUM 5000 UNITS: 5000 INJECTION INTRAVENOUS; SUBCUTANEOUS at 05:55

## 2020-01-04 RX ADMIN — IPRATROPIUM BROMIDE AND ALBUTEROL SULFATE 3 ML: 2.5; .5 SOLUTION RESPIRATORY (INHALATION) at 00:26

## 2020-01-04 RX ADMIN — METHYLPREDNISOLONE SODIUM SUCCINATE 60 MG: 125 INJECTION, POWDER, FOR SOLUTION INTRAMUSCULAR; INTRAVENOUS at 00:18

## 2020-01-04 RX ADMIN — IPRATROPIUM BROMIDE AND ALBUTEROL SULFATE 3 ML: 2.5; .5 SOLUTION RESPIRATORY (INHALATION) at 09:06

## 2020-01-04 RX ADMIN — METHYLPREDNISOLONE SODIUM SUCCINATE 60 MG: 125 INJECTION, POWDER, FOR SOLUTION INTRAMUSCULAR; INTRAVENOUS at 12:57

## 2020-01-04 RX ADMIN — PHENYTOIN SODIUM 100 MG: 100 CAPSULE ORAL at 08:43

## 2020-01-04 NOTE — DISCHARGE SUMMARY
Discharge- Carlos Enrique Fletcher 1945, 66 y o  female MRN: [id number]    Unit/Bed#: [facility] [address] 227[address]01 Encounter: [id number]    Primary Care Provider: Cristin Montes De Oca DO   Date and time admitted to hospital: 1/2/2020  4:25 PM        * Asthma exacerbation  Assessment & Plan  Due to RSV  She is much better  Will send home on Prednisone taper  I refilled her home albuterol Nebs  Epilepsy [facility])  Assessment & Plan  History of epilepsy on Dilantin  Continue home management  Discharging Physician / Practitioner: Derek Reason, DO  PCP: Cristin Montes De Oca DO  Admission Date:   Admission Orders (From admission, onward)     Ordered        01/02/20 1954  Inpatient Admission  Once                   Discharge Date: 01/04/20    Resolved Problems  Date Reviewed: 1/4/2020    None            ·       Reason for Admission: shortness of breath      Hospital Course:     Carlos Enrique Fletcher is a 66 y o  female patient who originally presented to the hospital on 1/2/2020 due to shortness of breath  She has an asthma exacerbation due to RSV  She steadily improved on IV steroids and nebtx  I will send her home on a prednisone taper  She had run out of her home albuterol nebs, so they were refilled  Please see above list of diagnoses and related plan for additional information  Condition at Discharge: good       Discharge Day Visit / Exam:     Subjective:  Feels better  Less sob  No cough      Vitals: Blood Pressure: 122/73 (01/04/20 0707)  Pulse: 95 (01/04/20 0707)  Temperature: 97 8 °F (36 6 °C) (01/04/20 0707)  Temp Source: Oral (01/03/20 1503)  Respirations: 18 (01/04/20 0707)  Weight - Scale: 78 9 kg (173 lb 15 1 oz) (01/02/20 1627)  SpO2: 95 % (01/04/20 0907)    Exam:     Physical Exam   HENT:   Head: Normocephalic and atraumatic  Eyes: Pupils are equal, round, and reactive to light  EOM are normal    Cardiovascular: Normal rate and regular rhythm  Exam reveals no gallop and no friction rub     No murmur heard   Pulmonary/Chest: [place] normal and breath sounds normal  She has no wheezes  She has no rales  Abdominal: Soft  Bowel sounds are normal  There is no tenderness  Musculoskeletal: She exhibits no edema  Nursing note and vitals reviewed            Discharge instructions/Information to patient and family:   See after visit summary for information provided to patient and family  Provisions for Follow-Up Care:  See after visit summary for information related to follow-up care and any pertinent home health orders  Disposition:     Home       Discharge Statement:  I spent 37 minutes discharging the patient  This time was spent on the day of discharge  I had direct contact with the patient on the day of discharge  Greater than 50% of the total time was spent examining patient, answering all patient questions, arranging and discussing plan of care with patient as well as directly providing post-discharge instructions  Additional time then spent on discharge activities  Discharge Medications:  See after visit summary for reconciled discharge medications provided to patient and family        ** Please Note: This note has been constructed using a voice recognition system **

## 2020-01-04 NOTE — PLAN OF CARE
Problem: Potential for Falls  Goal: Patient will remain free of falls  Description  INTERVENTIONS:  - Assess patient frequently for physical needs  -  Identify cognitive and physical deficits and behaviors that affect risk of falls    -  [place] fall precautions as indicated by assessment   - Educate patient/family on patient safety including physical limitations  - Instruct patient to call for assistance with activity based on assessment  - Modify environment to reduce risk of injury  - Consider OT/PT consult to assist with strengthening/mobility  Outcome: Progressing     Problem: PAIN - ADULT  Goal: Verbalizes/displays adequate comfort level or baseline comfort level  Description  Interventions:  - Encourage patient to monitor pain and request assistance  - Assess pain using appropriate pain scale  - Administer analgesics based on type and severity of pain and evaluate response  - Implement non-pharmacological measures as appropriate and evaluate response  - Consider cultural and social influences on pain and pain management  - Notify physician/advanced practitioner if interventions unsuccessful or patient reports new pain  Outcome: Progressing     Problem: INFECTION - ADULT  Goal: Absence or prevention of progression during hospitalization  Description  INTERVENTIONS:  - Assess and monitor for signs and symptoms of infection  - Monitor lab/diagnostic results  - Monitor all insertion sites, i e  indwelling lines, tubes, and drains  - Monitor endotracheal if appropriate and nasal secretions for changes in amount and color  - [place] appropriate cooling/warming therapies per order  - Administer medications as ordered  - Instruct and encourage patient and family to use good hand hygiene technique  - Identify and instruct in appropriate isolation precautions for identified infection/condition  Outcome: Progressing  Goal: Absence of fever/infection during neutropenic period  Description  INTERVENTIONS:  - Monitor WBC    Outcome: Progressing     Problem: SAFETY ADULT  Goal: Maintain or return to baseline ADL function  Description  INTERVENTIONS:  -  Assess patient's ability to carry out ADLs; assess patient's baseline for ADL function and identify physical deficits which impact ability to perform ADLs (bathing, care of mouth/teeth, toileting, grooming, dressing, etc )  - Assess/evaluate cause of self-care deficits   - Assess range of motion  - Assess patient's mobility; develop plan if impaired  - Assess patient's need for assistive devices and provide as appropriate  - Encourage maximum independence but intervene and supervise when necessary  - Involve family in performance of ADLs  - Assess for home care needs following discharge   - Consider OT consult to assist with ADL evaluation and planning for discharge  - Provide patient education as appropriate  Outcome: Progressing  Goal: Maintain or return mobility status to optimal level  Description  INTERVENTIONS:  - Assess patient's baseline mobility status (ambulation, transfers, stairs, etc )    - Identify cognitive and physical deficits and behaviors that affect mobility  - Identify mobility aids required to assist with transfers and/or ambulation (gait belt, sit-to-stand, lift, walker, cane, etc )  - [place] fall precautions as indicated by assessment  - Record patient progress and toleration of activity level on Mobility SBAR; progress patient to next Phase/Stage  - Instruct patient to call for assistance with activity based on assessment  - Consider rehabilitation consult to assist with strengthening/weightbearing, etc   Outcome: Progressing     Problem: DISCHARGE PLANNING  Goal: Discharge to home or other facility with appropriate resources  Description  INTERVENTIONS:  - Identify barriers to discharge w/patient and caregiver  - Arrange for needed discharge resources and transportation as appropriate  - Identify discharge learning needs (meds, wound care, etc )  - Arrange for interpretive services to assist at discharge as needed  - Refer to Case Management Department for coordinating discharge planning if the patient needs post-hospital services based on physician/advanced practitioner order or complex needs related to functional status, cognitive ability, or social support system  Outcome: Progressing

## 2020-01-04 NOTE — NURSING NOTE
All discharge instructions were given and reviewed with the patient  New prescriptions were explained to the patient  Her new prescriptions were picked up at the [place] [id number]  She took all belongings with when discharged

## 2020-01-04 NOTE — PLAN OF CARE
Problem: Potential for Falls  Goal: Patient will remain free of falls  Description  INTERVENTIONS:  - Assess patient frequently for physical needs  -  Identify cognitive and physical deficits and behaviors that affect risk of falls    -  [place] fall precautions as indicated by assessment   - Educate patient/family on patient safety including physical limitations  - Instruct patient to call for assistance with activity based on assessment  - Modify environment to reduce risk of injury  - Consider OT/PT consult to assist with strengthening/mobility  Outcome: Progressing     Problem: PAIN - ADULT  Goal: Verbalizes/displays adequate comfort level or baseline comfort level  Description  Interventions:  - Encourage patient to monitor pain and request assistance  - Assess pain using appropriate pain scale  - Administer analgesics based on type and severity of pain and evaluate response  - Implement non-pharmacological measures as appropriate and evaluate response  - Consider cultural and social influences on pain and pain management  - Notify physician/advanced practitioner if interventions unsuccessful or patient reports new pain  Outcome: Progressing     Problem: INFECTION - ADULT  Goal: Absence or prevention of progression during hospitalization  Description  INTERVENTIONS:  - Assess and monitor for signs and symptoms of infection  - Monitor lab/diagnostic results  - Monitor all insertion sites, i e  indwelling lines, tubes, and drains  - Monitor endotracheal if appropriate and nasal secretions for changes in amount and color  - [place] appropriate cooling/warming therapies per order  - Administer medications as ordered  - Instruct and encourage patient and family to use good hand hygiene technique  - Identify and instruct in appropriate isolation precautions for identified infection/condition  Outcome: Progressing  Goal: Absence of fever/infection during neutropenic period  Description  INTERVENTIONS:  - Monitor WBC    Outcome: Progressing     Problem: SAFETY ADULT  Goal: Maintain or return to baseline ADL function  Description  INTERVENTIONS:  -  Assess patient's ability to carry out ADLs; assess patient's baseline for ADL function and identify physical deficits which impact ability to perform ADLs (bathing, care of mouth/teeth, toileting, grooming, dressing, etc )  - Assess/evaluate cause of self-care deficits   - Assess range of motion  - Assess patient's mobility; develop plan if impaired  - Assess patient's need for assistive devices and provide as appropriate  - Encourage maximum independence but intervene and supervise when necessary  - Involve family in performance of ADLs  - Assess for home care needs following discharge   - Consider OT consult to assist with ADL evaluation and planning for discharge  - Provide patient education as appropriate  Outcome: Progressing  Goal: Maintain or return mobility status to optimal level  Description  INTERVENTIONS:  - Assess patient's baseline mobility status (ambulation, transfers, stairs, etc )    - Identify cognitive and physical deficits and behaviors that affect mobility  - Identify mobility aids required to assist with transfers and/or ambulation (gait belt, sit-to-stand, lift, walker, cane, etc )  - [place] fall precautions as indicated by assessment  - Record patient progress and toleration of activity level on Mobility SBAR; progress patient to next Phase/Stage  - Instruct patient to call for assistance with activity based on assessment  - Consider rehabilitation consult to assist with strengthening/weightbearing, etc   Outcome: Progressing     Problem: DISCHARGE PLANNING  Goal: Discharge to home or other facility with appropriate resources  Description  INTERVENTIONS:  - Identify barriers to discharge w/patient and caregiver  - Arrange for needed discharge resources and transportation as appropriate  - Identify discharge learning needs (meds, wound care, etc )  - Arrange for interpretive services to assist at discharge as needed  - Refer to Case Management Department for coordinating discharge planning if the patient needs post-hospital services based on physician/advanced practitioner order or complex needs related to functional status, cognitive ability, or social support system  Outcome: Progressing

## 2020-01-04 NOTE — ASSESSMENT & PLAN NOTE
Due to RSV  She is much better  Will send home on Prednisone taper  I refilled her home albuterol Nebs

## 2020-01-07 LAB
BACTERIA BLD CULT: NORMAL
BACTERIA BLD CULT: NORMAL

## 2020-11-06 ENCOUNTER — OFFICE VISIT (OUTPATIENT)
Dept: URGENT CARE | Facility: MEDICAL CENTER | Age: 75
End: 2020-11-06
Payer: MEDICARE

## 2020-11-06 VITALS
BODY MASS INDEX: 31.14 KG/M2 | WEIGHT: 173 LBS | RESPIRATION RATE: 24 BRPM | HEART RATE: 106 BPM | OXYGEN SATURATION: 96 % | TEMPERATURE: 97.2 F

## 2020-11-06 DIAGNOSIS — Z11.59 SPECIAL SCREENING EXAMINATION FOR UNSPECIFIED VIRAL DISEASE: Primary | ICD-10-CM

## 2020-11-06 PROCEDURE — 99212 OFFICE O/P EST SF 10 MIN: CPT | Performed by: PHYSICIAN ASSISTANT

## 2020-11-06 PROCEDURE — G0463 HOSPITAL OUTPT CLINIC VISIT: HCPCS | Performed by: PHYSICIAN ASSISTANT

## 2020-11-06 PROCEDURE — U0003 INFECTIOUS AGENT DETECTION BY NUCLEIC ACID (DNA OR RNA); SEVERE ACUTE RESPIRATORY SYNDROME CORONAVIRUS 2 (SARS-COV-2) (CORONAVIRUS DISEASE [COVID-19]), AMPLIFIED PROBE TECHNIQUE, MAKING USE OF HIGH THROUGHPUT TECHNOLOGIES AS DESCRIBED BY CMS-2020-01-R: HCPCS | Performed by: PHYSICIAN ASSISTANT

## 2020-11-08 LAB — SARS-COV-2 RNA SPEC QL NAA+PROBE: NOT DETECTED

## 2020-11-11 ENCOUNTER — TELEPHONE (OUTPATIENT)
Dept: URGENT CARE | Facility: MEDICAL CENTER | Age: 75
End: 2020-11-11

## 2021-03-12 ENCOUNTER — IMMUNIZATIONS (OUTPATIENT)
Dept: FAMILY MEDICINE CLINIC | Facility: HOSPITAL | Age: 76
End: 2021-03-12

## 2021-03-12 DIAGNOSIS — Z23 ENCOUNTER FOR IMMUNIZATION: Primary | ICD-10-CM

## 2021-03-12 PROCEDURE — 91301 SARS-COV-2 / COVID-19 MRNA VACCINE (MODERNA) 100 MCG: CPT

## 2021-03-12 PROCEDURE — 0012A SARS-COV-2 / COVID-19 MRNA VACCINE (MODERNA) 100 MCG: CPT

## 2021-04-09 ENCOUNTER — IMMUNIZATIONS (OUTPATIENT)
Dept: FAMILY MEDICINE CLINIC | Facility: HOSPITAL | Age: 76
End: 2021-04-09

## 2021-04-09 DIAGNOSIS — Z23 ENCOUNTER FOR IMMUNIZATION: Primary | ICD-10-CM

## 2021-04-09 PROCEDURE — 91301 SARS-COV-2 / COVID-19 MRNA VACCINE (MODERNA) 100 MCG: CPT

## 2021-04-09 PROCEDURE — 0012A SARS-COV-2 / COVID-19 MRNA VACCINE (MODERNA) 100 MCG: CPT

## 2023-06-12 ENCOUNTER — APPOINTMENT (OUTPATIENT)
Dept: RADIOLOGY | Facility: MEDICAL CENTER | Age: 78
End: 2023-06-12
Attending: PHYSICIAN ASSISTANT
Payer: MEDICARE

## 2023-06-12 ENCOUNTER — OFFICE VISIT (OUTPATIENT)
Dept: URGENT CARE | Facility: MEDICAL CENTER | Age: 78
End: 2023-06-12
Payer: MEDICARE

## 2023-06-12 VITALS
BODY MASS INDEX: 31.83 KG/M2 | OXYGEN SATURATION: 100 % | SYSTOLIC BLOOD PRESSURE: 144 MMHG | RESPIRATION RATE: 16 BRPM | HEART RATE: 76 BPM | DIASTOLIC BLOOD PRESSURE: 63 MMHG | HEIGHT: 62 IN | TEMPERATURE: 97.8 F | WEIGHT: 173 LBS

## 2023-06-12 DIAGNOSIS — M25.561 RIGHT KNEE PAIN, UNSPECIFIED CHRONICITY: ICD-10-CM

## 2023-06-12 DIAGNOSIS — M25.561 RIGHT KNEE PAIN, UNSPECIFIED CHRONICITY: Primary | ICD-10-CM

## 2023-06-12 PROCEDURE — 73564 X-RAY EXAM KNEE 4 OR MORE: CPT

## 2023-06-12 NOTE — PROGRESS NOTES
[facility]        NAME: Kalani Pettit is a 76 y o  female  : 1945    MRN: [id number]  DATE: 2023  TIME: 2:28 PM    Assessment and Plan   Right knee pain, unspecified chronicity [M25 561]  1  Right knee pain, unspecified chronicity  XR knee 4+ vw right injury            Patient Instructions       Follow up with PCP  Aleve and ice  X-ray read by me as DJD with bone-on-bone medially but no acute change  I explained that she flared up her arthritis  Chief Complaint     Chief Complaint   Patient presents with   • Knee Pain     Around 2 am this pt tripped and landed on R knee  Denies LOC, denies head strike, denies any other injury  States that knee hurts to walk on  History of Present Illness       Patient tripped and fell landing on the patella of her right knee  Knee Pain   The incident occurred 12 to 24 hours ago  The incident occurred at home  The injury mechanism was a fall  The pain is present in the right knee  The quality of the pain is described as aching  The pain is moderate  The pain has been constant since onset  Associated symptoms include an inability to bear weight  Pertinent negatives include no loss of motion, loss of sensation, muscle weakness, numbness or tingling  She reports no foreign bodies present  The symptoms are aggravated by weight bearing, movement and palpation  She has tried nothing for the symptoms  The treatment provided no relief  Review of Systems   Review of Systems   Neurological: Negative for tingling and numbness  All other systems reviewed and are negative          Current Medications       Current Outpatient Medications:   •  ADVAIR -21 MCG/ACT inhaler, Inhale 2 puffs 2 (two) times a day , Disp: , Rfl:   •  allopurinol (ZYLOPRIM) 100 mg tablet, , Disp: , Rfl:   •  cetirizine (ZyrTEC) 10 mg tablet, , Disp: , Rfl:   •  colchicine (COLCRYS) 0 6 mg tablet, Take 0 6 mg by mouth as needed for muscle/joint pain (gout flares), Disp: , Rfl:   •  ergocalciferol (VITAMIN D2) 50,000 units, TK 1 C PO 1 TIME Q WK, Disp: , Rfl: 0  •  fluticasone (FLONASE) 50 mcg/act nasal spray, , Disp: , Rfl:   •  phenytoin (DILANTIN) 200 MG ER capsule, Take 1 capsule (200 mg total) by mouth every 12 (twelve) hours, Disp: , Rfl: 0  •  theophylline (UNIPHYL) 400 mg 24 hr tablet, Take 400 mg by mouth 2 (two) times a day , Disp: , Rfl:   •  VENTOLIN  (90 Base) MCG/ACT inhaler, , Disp: , Rfl:   •  albuterol (2 5 mg/3 mL) 0 083 % nebulizer solution, Take 1 vial (2 5 mg total) by nebulization every 4 (four) hours as needed for wheezing or shortness of breath (Patient not taking: Reported on 6/12/2023), Disp: 100 vial, Rfl: 3  •  predniSONE 10 mg tablet, 6 tabs daily for 2 days, then 5 tabs for 2 days then 4 tabs for 2 days then 3 tabs for 2 days then 1 tab for 2 days then stop (Patient not taking: Reported on 6/12/2023), Disp: 42 tablet, Rfl: 0  •  traMADol (ULTRAM) 50 mg tablet, Take 1 tablet (50 mg total) by mouth every 6 (six) hours as needed for moderate pain (Patient not taking: Reported on 6/12/2023), Disp: 12 tablet, Rfl: 0    Current Allergies     Allergies as of 06/12/2023 - Reviewed 06/12/2023   Allergen Reaction Noted   • Tetracycline GI Intolerance 08/30/2019            The following portions of the patient's history were reviewed and updated as appropriate: allergies, current medications, past family history, past medical history, past social history, past surgical history and problem list      Past Medical History:   Diagnosis Date   • Arthritis    • Asthma    • Chronic pain disorder    • Gout    • Seasonal allergies    • Seizures ([address] )     petite last unknown   • Vitamin D deficiency        Past Surgical History:   Procedure Laterality Date   • COLONOSCOPY     • GA NEUROPLASTY &/TRANSPOS MEDIAN NRV CARPAL TUNNE Right 9/5/2019    Procedure: RELEASE CARPAL TUNNEL WRIST;  Surgeon: Aurelia Ray MD;  Location: [address] [place] [address];  Service: Orthopedics   • TUBAL "LIGATION         Family History   Problem Relation Age of Onset   • Diabetes Mother    • Alzheimer's disease Father    • Diabetes Family    • Arthritis Family          Medications have been verified  Objective   /63 (BP Location: Left arm, Patient Position: Sitting, Cuff Size: Standard)   Pulse 76   Temp 97 8 °F (36 6 °C) (Tympanic)   Resp 16   Ht 5' 2\" (1 575 m)   Wt 78 5 kg (173 lb)   SpO2 100%   BMI 31 64 kg/m²   No LMP recorded  Patient is postmenopausal        Physical Exam     Physical Exam  Vitals and nursing note reviewed  Constitutional:       Appearance: Normal appearance  She is obese  Cardiovascular:      Rate and Rhythm: Normal rate and regular rhythm  Pulses: Normal pulses  Heart sounds: Normal heart sounds  Pulmonary:      Effort: Pulmonary effort is normal       Breath sounds: Normal breath sounds  Neurological:      Mental Status: She is alert  Psychiatric:         Mood and Affect: Mood normal          Behavior: Behavior normal          Right knee full range of motion mild edema laterally  No effusion  Tender along the medial joint line    No instability          "

## 2023-06-13 ENCOUNTER — TELEPHONE (OUTPATIENT)
Dept: URGENT CARE | Facility: MEDICAL CENTER | Age: 78
End: 2023-06-13

## 2023-06-13 DIAGNOSIS — S82.001A CLOSED NONDISPLACED FRACTURE OF RIGHT PATELLA, UNSPECIFIED FRACTURE MORPHOLOGY, INITIAL ENCOUNTER: Primary | ICD-10-CM

## 2023-06-13 NOTE — TELEPHONE ENCOUNTER
I spoke with the patient's spouse, Lev Griffin, and informed him that at [place] has a fracture of the left knee  He informs me that the patient has swelling and bruising and is having difficulty ambulating using a cane  I asked him to have the patient come to this office to have your knee immobilized and to  a referral for orthopedic consultation  He expressed understanding and will notify the patient immediately

## 2025-06-15 ENCOUNTER — APPOINTMENT (OUTPATIENT)
Dept: RADIOLOGY | Facility: MEDICAL CENTER | Age: 80
End: 2025-06-15
Attending: FAMILY MEDICINE
Payer: MEDICARE

## 2025-06-15 ENCOUNTER — OFFICE VISIT (OUTPATIENT)
Dept: URGENT CARE | Facility: MEDICAL CENTER | Age: 80
End: 2025-06-15
Payer: MEDICARE

## 2025-06-15 VITALS
SYSTOLIC BLOOD PRESSURE: 133 MMHG | HEART RATE: 101 BPM | DIASTOLIC BLOOD PRESSURE: 69 MMHG | TEMPERATURE: 97.9 F | OXYGEN SATURATION: 99 % | RESPIRATION RATE: 18 BRPM

## 2025-06-15 DIAGNOSIS — M79.671 RIGHT FOOT PAIN: ICD-10-CM

## 2025-06-15 DIAGNOSIS — M79.671 RIGHT FOOT PAIN: Primary | ICD-10-CM

## 2025-06-15 PROCEDURE — 73610 X-RAY EXAM OF ANKLE: CPT

## 2025-06-15 PROCEDURE — 73630 X-RAY EXAM OF FOOT: CPT

## 2025-06-15 PROCEDURE — 99213 OFFICE O/P EST LOW 20 MIN: CPT | Performed by: FAMILY MEDICINE

## 2025-06-15 PROCEDURE — G0463 HOSPITAL OUTPT CLINIC VISIT: HCPCS | Performed by: FAMILY MEDICINE

## 2025-06-15 NOTE — PATIENT INSTRUCTIONS
Please wear the Post Op shoe  Await the official read on the Foot Xray- preliminary read I don't see an acute fracture  Ice, elevate and rest the foot  May use Advil for pain  Follow up with Orthopedics if symptoms do not improve over the next one week.

## 2025-06-15 NOTE — PROGRESS NOTES
[facility]  Name: Almita Lam      : 1945      MRN: [id number]  Encounter Provider: Cora Veliz MD  Encounter Date: 6/15/2025   Encounter department: [facility] [place]  :  Assessment & Plan  Right foot pain  Right foot and ankle xray: no acute fracture visible, + degenerative changes  Ice, elevate and rest foot  Applied Post - op shoe  Orders:  •  XR foot 3+ vw right; Future  •  XR ankle 3+ vw right; Future        Patient Instructions  Please wear the Post Op shoe  Await the official read on the Foot Xray- preliminary read I don't see an acute fracture  Ice, elevate and rest the foot  May use Advil for pain  Follow up with Orthopedics if symptoms do not improve over the next one week.    If tests are performed, our office will contact you with results only if changes need to made to the care plan discussed with you at the visit. You can review your full results on [facility].    Chief Complaint:   Chief Complaint   Patient presents with   • Foot Pain     Patoent c/o right foot and ankle pain after she twisted it walking up a ramp yesterday.      History of Present Illness   HPI  Almita Lam is a 79 y.o. female  who [facility] today with a h/o of right foot pain.  Pt was walking yesterday up a ramp at the [facility] and turned her foot more inwards to walk up it as this was more comfortabl efor her, but over twisted it.  Today after waking up she noticed increased foot pain and more pain while walking on it.  She is able to bear very little weight on it while using her walking cane.    History obtained from: patient    Review of Systems   Constitutional:  Negative for chills and fever.   HENT:  Negative for congestion, rhinorrhea and sore throat.    Respiratory:  Negative for cough and shortness of breath.    Cardiovascular:  Negative for chest pain.   Gastrointestinal:  Negative for diarrhea, nausea and vomiting.   Musculoskeletal:  Positive  for gait problem.   Skin:  Negative for rash.   Neurological:  Negative for dizziness and headaches.     Past Medical History   Past Medical History[1]  Past Surgical History[2]  Family History[3]  she reports that she has never smoked. She has never used smokeless tobacco. She reports current alcohol use. She reports that she does not use drugs.  Current Outpatient Medications   Medication Instructions   • ADVAIR -21 MCG/ACT inhaler 2 puffs, Inhalation, 2 times daily   • albuterol 2.5 mg, Nebulization, Every 4 hours PRN   • allopurinol (ZYLOPRIM) 100 mg tablet No dose, route, or frequency recorded.   • cetirizine (ZyrTEC) 10 mg tablet No dose, route, or frequency recorded.   • colchicine (COLCRYS) 0.6 mg, Oral, As needed   • ergocalciferol (VITAMIN D2) 50,000 units TK 1 C PO 1 TIME Q WK   • fluticasone (FLONASE) 50 mcg/act nasal spray No dose, route, or frequency recorded.   • phenytoin extended (DILANTIN) 200 mg, Oral, Every 12 hours scheduled   • predniSONE 10 mg tablet 6 tabs daily for 2 days, then 5 tabs for 2 days then 4 tabs for 2 days then 3 tabs for 2 days then 1 tab for 2 days then stop   • theophylline (UNIPHYL) 400 mg, Oral, 2 times daily   • traMADol (ULTRAM) 50 mg, Oral, Every 6 hours PRN   • VENTOLIN  (90 Base) MCG/ACT inhaler No dose, route, or frequency recorded.   Allergies[4]     Objective   /69   Pulse 101   Temp 97.9 °F (36.6 °C)   Resp 18   SpO2 99%      Physical Exam  Vitals and nursing note reviewed.   Constitutional:       Appearance: Normal appearance.   HENT:      Head: Normocephalic and atraumatic.      Mouth/Throat:      Mouth: Mucous membranes are moist.     Cardiovascular:      Rate and Rhythm: Normal rate.   Pulmonary:      Effort: Pulmonary effort is normal.     Musculoskeletal:      Right foot: Decreased range of motion. Swelling, deformity, bunion and tenderness present.     Neurological:      Mental Status: She is alert and oriented to person, place, and  "time.     Psychiatric:         Mood and Affect: Mood normal.         Behavior: Behavior normal.         Portions of the record may have been created with voice recognition software.  Occasional wrong word or \"sound a like\" substitutions may have occurred due to the inherent limitations of voice recognition software.  Read the chart carefully and recognize, using context, where substitutions have occurred.         [1]  Past Medical History:  Diagnosis Date   • Arthritis    • Asthma    • Chronic pain disorder    • Gout    • Seasonal allergies    • Seizures (HCC)     petite last unknown   • Vitamin D deficiency    [2]  Past Surgical History:  Procedure Laterality Date   • COLONOSCOPY     • [place] NEUROPLASTY &/TRANSPOS MEDIAN NRV CARPAL TUNNE Right 9/5/2019    Procedure: RELEASE CARPAL TUNNEL WRIST;  Surgeon: Arthur Garcias MD;  Location:  MAIN OR;  Service: Orthopedics   • TUBAL LIGATION     [3]  Family History  Problem Relation Name Age of Onset   • Diabetes Mother     • Alzheimer's disease Father     • Diabetes Family     • Arthritis Family     [4]  Allergies  Allergen Reactions   • Cat Dander Other (See Comments)   • Molds & Smuts Other (See Comments)   • Other Other (See Comments)   • Tetracycline GI Intolerance     Other reaction(s): GI Intolerance   "

## 2025-06-17 ENCOUNTER — RESULTS FOLLOW-UP (OUTPATIENT)
Dept: URGENT CARE | Facility: MEDICAL CENTER | Age: 80
End: 2025-06-17

## 2025-06-17 NOTE — LETTER
Almita Lam  [address] [facility] [address]      June 20, 2025      Dear: Dr. Burt,        Our office has attempted to contact your patient, Almita Lam, several times regarding the following results:    Resulted Orders   XR foot 3+ vw right    Narrative    XR FOOT 3+ VW RIGHT    INDICATION: M79.671: Pain in right foot.    COMPARISON: None    FINDINGS:    No acute fractures are seen. There is subluxation of the second MTP joint. Correlate with patient history and physical exam to assess whether this is acute or chronic. There is hallux valgus deformity.  Bunion  Severe degenerative arthritis first MTP joint with some additional changes noted at the second and third MTP joints and at the TMT joints and intertarsal joints as characterized by some joint narrowing and sclerosis of the articular margins.    There seems to be a mildly sclerotic appearance of the shaft of the second metatarsal. The precise etiology is uncertain. Perhaps insufficiency/stress fracture healing. No bony deformity seen.    Unremarkable soft tissues.      Impression    Subluxation of the second MTP joint of uncertain chronicity. Correlate clinically.    Hallux valgus deformity with bunion.    Degenerative changes as noted above.    Sclerotic appearance of the second metatarsal shaft of uncertain etiology. This can be seen in the setting of healing stress/insufficiency fractures. No bony deformity seen, however. MRI may be helpful for further characterization if deemed clinically   relevant.      Computerized Assisted Algorithm (CAA) may have been used to analyze all applicable images.      Workstation performed: [id number]            If you have any questions, please contact us at [phone].      Thank you.     Sincerely,    Deepti Marsh PA-C      [facility] [place] [address]  [place] [address]  Phone: [phone]

## 2025-06-17 NOTE — LETTER
Almita Lam  [address] [facility] [address]      June 20, 2025      Dear: Almita Lam            Our office has attempted to contact you several times regarding the following results:    Resulted Orders   XR foot 3+ vw right    Narrative    XR FOOT 3+ VW RIGHT    INDICATION: M79.671: Pain in right foot.    COMPARISON: None    FINDINGS:    No acute fractures are seen. There is subluxation of the second MTP joint. Correlate with patient history and physical exam to assess whether this is acute or chronic. There is hallux valgus deformity.  Bunion  Severe degenerative arthritis first MTP joint with some additional changes noted at the second and third MTP joints and at the TMT joints and intertarsal joints as characterized by some joint narrowing and sclerosis of the articular margins.    There seems to be a mildly sclerotic appearance of the shaft of the second metatarsal. The precise etiology is uncertain. Perhaps insufficiency/stress fracture healing. No bony deformity seen.    Unremarkable soft tissues.      Impression    Subluxation of the second MTP joint of uncertain chronicity. Correlate clinically.    Hallux valgus deformity with bunion.    Degenerative changes as noted above.    Sclerotic appearance of the second metatarsal shaft of uncertain etiology. This can be seen in the setting of healing stress/insufficiency fractures. No bony deformity seen, however. MRI may be helpful for further characterization if deemed clinically   relevant.      Computerized Assisted Algorithm (CAA) may have been used to analyze all applicable images.      Workstation performed: [id number]            If you have any questions, please contact us at [phone].      Thank you.     Sincerely,    Deepti Marsh PA-C      [facility] [place] [address], [facility] [address]  [place] [address]  Phone: [phone]

## 2025-06-17 NOTE — TELEPHONE ENCOUNTER
Called pt on phone number listed and no answer.  Attempted to leave message but unable to.  Called pt to discuss abnormal Xray final reading.  Pt should follow up with ortho or podiatry due to final reading/findings.

## 2025-06-20 ENCOUNTER — TELEPHONE (OUTPATIENT)
Dept: URGENT CARE | Facility: MEDICAL CENTER | Age: 80
End: 2025-06-20

## (undated) DEVICE — 3M™ MICROFOAM™ SURGICAL TAPE 4 ROLLS/CARTON 6 CARTONS/CASE 1528-3: Brand: 3M™ MICROFOAM™

## (undated) DEVICE — GAUZE SPONGES,16 PLY: Brand: CURITY

## (undated) DEVICE — ACE WRAP 3 IN STERILE

## (undated) DEVICE — ALCON SURGICAL BLADE 64: Brand: ALCON

## (undated) DEVICE — STERILE POLYISOPRENE POWDER-FREE SURGICAL GLOVES WITH EMOLLIENT COATING: Brand: PROTEXIS

## (undated) DEVICE — BETHLEHEM UNIVERSAL  MIONR EXT: Brand: CARDINAL HEALTH

## (undated) DEVICE — SYRINGE 30ML LL

## (undated) DEVICE — SMARTGOWN SURGICAL GOWN, LARGE: Brand: CONVERTORS

## (undated) DEVICE — STRETCH BANDAGE: Brand: CURITY

## (undated) DEVICE — NEEDLE BLUNT 18 G X 1 1/2IN

## (undated) DEVICE — SUT PROLENE 4-0 PS-2 18 IN 8682G

## (undated) DEVICE — CURITY NON-ADHERENT STRIPS: Brand: CURITY

## (undated) DEVICE — CABLE BIPOLAR DISP MEGADYNE

## (undated) DEVICE — STERILE POLYISOPRENE POWDER-FREE SURGICAL GLOVES: Brand: PROTEXIS

## (undated) DEVICE — CHLORAPREP HI-LITE 26ML ORANGE

## (undated) DEVICE — GAUZE ROLL,3 PLY: Brand: DERMACEA

## (undated) DEVICE — UNDERGLOVE PROTEXIS  BLUE SZ 7

## (undated) DEVICE — NEEDLE 25G X 5/8 SAFETY

## (undated) DEVICE — DRAPE SHEET THREE QUARTER

## (undated) DEVICE — INTENDED FOR TISSUE SEPARATION, AND OTHER PROCEDURES THAT REQUIRE A SHARP SURGICAL BLADE TO PUNCTURE OR CUT.: Brand: BARD-PARKER SAFETY BLADES SIZE 15, STERILE

## (undated) DEVICE — STOCKINETTE 2P PREROLLD 4X48

## (undated) DEVICE — CUFF TOURNIQUET DISP SZ18